# Patient Record
Sex: FEMALE | Race: OTHER | NOT HISPANIC OR LATINO
[De-identification: names, ages, dates, MRNs, and addresses within clinical notes are randomized per-mention and may not be internally consistent; named-entity substitution may affect disease eponyms.]

---

## 2017-01-23 ENCOUNTER — APPOINTMENT (OUTPATIENT)
Dept: CARDIOLOGY | Facility: CLINIC | Age: 27
End: 2017-01-23

## 2017-01-23 VITALS — SYSTOLIC BLOOD PRESSURE: 110 MMHG | DIASTOLIC BLOOD PRESSURE: 70 MMHG | HEART RATE: 89 BPM

## 2017-01-23 VITALS — OXYGEN SATURATION: 99 %

## 2017-01-23 RX ORDER — BLOOD SUGAR DIAGNOSTIC
STRIP MISCELLANEOUS
Qty: 150 | Refills: 0 | Status: ACTIVE | COMMUNITY
Start: 2016-10-30

## 2017-02-16 ENCOUNTER — APPOINTMENT (OUTPATIENT)
Dept: CARDIOLOGY | Facility: CLINIC | Age: 27
End: 2017-02-16

## 2017-03-28 ENCOUNTER — APPOINTMENT (OUTPATIENT)
Dept: CARDIOLOGY | Facility: CLINIC | Age: 27
End: 2017-03-28

## 2017-11-29 ENCOUNTER — APPOINTMENT (OUTPATIENT)
Dept: CARDIOLOGY | Facility: CLINIC | Age: 27
End: 2017-11-29

## 2017-11-29 VITALS — WEIGHT: 133 LBS | HEIGHT: 61 IN | BODY MASS INDEX: 25.11 KG/M2

## 2017-11-29 VITALS — DIASTOLIC BLOOD PRESSURE: 80 MMHG | HEART RATE: 96 BPM | SYSTOLIC BLOOD PRESSURE: 110 MMHG

## 2018-01-03 ENCOUNTER — APPOINTMENT (OUTPATIENT)
Dept: CARDIOLOGY | Facility: CLINIC | Age: 28
End: 2018-01-03

## 2018-03-20 ENCOUNTER — APPOINTMENT (OUTPATIENT)
Dept: CARDIOLOGY | Facility: CLINIC | Age: 28
End: 2018-03-20

## 2021-12-27 ENCOUNTER — APPOINTMENT (OUTPATIENT)
Dept: CARDIOLOGY | Facility: CLINIC | Age: 31
End: 2021-12-27
Payer: COMMERCIAL

## 2021-12-27 VITALS — HEIGHT: 61 IN | TEMPERATURE: 98 F | BODY MASS INDEX: 25.86 KG/M2 | WEIGHT: 137 LBS

## 2021-12-27 VITALS — SYSTOLIC BLOOD PRESSURE: 110 MMHG | HEART RATE: 91 BPM | DIASTOLIC BLOOD PRESSURE: 66 MMHG

## 2021-12-27 PROCEDURE — 93000 ELECTROCARDIOGRAM COMPLETE: CPT

## 2021-12-27 PROCEDURE — 99204 OFFICE O/P NEW MOD 45 MIN: CPT

## 2021-12-27 RX ORDER — ELETRIPTAN HYDROBROMIDE 40 MG/1
40 TABLET, FILM COATED ORAL
Refills: 0 | Status: DISCONTINUED | COMMUNITY
End: 2021-12-27

## 2021-12-27 NOTE — CARDIOLOGY SUMMARY
[___] : [unfilled] [de-identified] : 12- NSR NS T wave change.  [de-identified] :  Average HR 82  [de-identified] : 12- ETT Completed 9 minutes and 36 seconds . No ischemia  [de-identified] : 12- Normal LV systolic function Trace MR and TR

## 2021-12-27 NOTE — ASSESSMENT
[FreeTextEntry1] : The patient has had palpitations which has been in Sinus tachycardia in the past . She has periods when she has tachycardia when she walks short distances . The patient had a Dimer which was low . The patient has had a stress test which was negative for ischemia at high exercise load . Echo showed a  structurally normal heart . Holter showed an average HR of 82 but highest HR was 179 . The patient carries a diagnosis of SLE and has a positive DS DNA . 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

## 2021-12-27 NOTE — REASON FOR VISIT
[Follow-Up - Clinic] : a clinic follow-up of [Chest Pain] : chest pain [Dizziness] : dizziness [Palpitations] : palpitations

## 2021-12-27 NOTE — HISTORY OF PRESENT ILLNESS
[FreeTextEntry1] : The patient has not been seen since 2017 . The patient has long standing history of Type I DM , historyh of Sinus tachycardia in the past ., who 3 weeks ago started to experieince tachycardia . She was seen in [facility] and discharged . D dimer was low and she did not have a CT scan of the chest . The pateint has had some chest discomfort only when going to fast . She had seen a Cardiologist who did a stress test which was negative for ischemia , completed stage III . Echo was unremarkable . She had a Holter monitor which was done with an average HR of 82 . She is stressed with work and with her 3 children one with Autism

## 2021-12-27 NOTE — PHYSICAL EXAM
[General Appearance - Well Developed] : well developed [Normal Appearance] : normal appearance [Well Groomed] : well groomed [General Appearance - Well Nourished] : well nourished [No Deformities] : no deformities [General Appearance - In No Acute Distress] : no acute distress [Normal Conjunctiva] : the conjunctiva exhibited no abnormalities [Eyelids - No Xanthelasma] : the eyelids demonstrated no xanthelasmas [Normal Oral Mucosa] : normal oral mucosa [No Oral Pallor] : no oral pallor [No Oral Cyanosis] : no oral cyanosis [Normal Jugular Venous A Waves Present] : normal jugular venous A waves present [Normal Jugular Venous V Waves Present] : normal jugular venous V waves present [No Jugular Venous Ray A Waves] : no jugular venous ray A waves [Respiration, Rhythm And Depth] : normal respiratory rhythm and effort [Exaggerated Use Of Accessory Muscles For Inspiration] : no accessory muscle use [Auscultation Breath Sounds / Voice Sounds] : lungs were clear to auscultation bilaterally [Heart Rate And Rhythm] : heart rate and rhythm were normal [Heart Sounds] : normal S1 and S2 [Murmurs] : no murmurs present [Abdomen Soft] : soft [Abdomen Tenderness] : non-tender [Abdomen Mass (___ Cm)] : no abdominal mass palpated [Abnormal Walk] : normal gait [Gait - Sufficient For Exercise Testing] : the gait was sufficient for exercise testing [Nail Clubbing] : no clubbing of the fingernails [Cyanosis, Localized] : no localized cyanosis [Petechial Hemorrhages (___cm)] : no petechial hemorrhages [Skin Color & Pigmentation] : normal skin color and pigmentation [] : no rash [No Venous Stasis] : no venous stasis [Skin Lesions] : no skin lesions [No Skin Ulcers] : no skin ulcer [No Xanthoma] : no  xanthoma was observed [Oriented To Time, Place, And Person] : oriented to person, place, and time [Affect] : the affect was normal [Mood] : the mood was normal [No Anxiety] : not feeling anxious

## 2021-12-27 NOTE — REVIEW OF SYSTEMS
[Feeling Fatigued] : feeling fatigued [SOB] : shortness of breath [Dyspnea on exertion] : dyspnea during exertion [Chest Discomfort] : chest discomfort [Negative] : Psychiatric

## 2022-01-31 ENCOUNTER — APPOINTMENT (OUTPATIENT)
Dept: CARDIOLOGY | Facility: CLINIC | Age: 32
End: 2022-01-31
Payer: COMMERCIAL

## 2022-01-31 VITALS — WEIGHT: 136 LBS | TEMPERATURE: 97 F | BODY MASS INDEX: 25.68 KG/M2 | HEIGHT: 61 IN

## 2022-01-31 VITALS — DIASTOLIC BLOOD PRESSURE: 64 MMHG | SYSTOLIC BLOOD PRESSURE: 110 MMHG | HEART RATE: 84 BPM

## 2022-01-31 PROCEDURE — 99214 OFFICE O/P EST MOD 30 MIN: CPT

## 2022-01-31 PROCEDURE — 93000 ELECTROCARDIOGRAM COMPLETE: CPT

## 2022-01-31 NOTE — CARDIOLOGY SUMMARY
[___] : [unfilled] [de-identified] : 12- NSR NS T wave change.  [de-identified] :  Average HR 82  [de-identified] : 12- ETT Completed 9 minutes and 36 seconds . No ischemia  [de-identified] : 12- Normal LV systolic function Trace MR and TR

## 2022-01-31 NOTE — ASSESSMENT
[FreeTextEntry1] : The patient has improved. She has less tachycardia and is going back to the gym. The patient had an ETT  which she was ablae to exercise into stage IV  and echo was structurally normal. MCOT showed an average HR of 83 and her heart rate decreases to the 50's during sleep . The patient has has seen psychiatry and she feels she is depressed . She was told to start Zoloft . D Dimer was low at last ER visit

## 2022-01-31 NOTE — HISTORY OF PRESENT ILLNESS
[FreeTextEntry1] : The patient has been feeling better. MCOT showed that the HR was in the 80's and goes into the 50's when sleeping . 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

## 2022-02-07 ENCOUNTER — APPOINTMENT (OUTPATIENT)
Dept: CARDIOLOGY | Facility: CLINIC | Age: 32
End: 2022-02-07
Payer: COMMERCIAL

## 2022-02-07 PROCEDURE — 93228 REMOTE 30 DAY ECG REV/REPORT: CPT

## 2022-05-20 ENCOUNTER — APPOINTMENT (OUTPATIENT)
Dept: CARDIOLOGY | Facility: CLINIC | Age: 32
End: 2022-05-20

## 2023-11-20 ENCOUNTER — APPOINTMENT (OUTPATIENT)
Dept: CARDIOLOGY | Facility: CLINIC | Age: 33
End: 2023-11-20
Payer: COMMERCIAL

## 2023-11-20 VITALS — TEMPERATURE: 97.6 F | WEIGHT: 137 LBS | HEIGHT: 61 IN | BODY MASS INDEX: 25.86 KG/M2

## 2023-11-20 VITALS — SYSTOLIC BLOOD PRESSURE: 114 MMHG | HEART RATE: 83 BPM | DIASTOLIC BLOOD PRESSURE: 80 MMHG

## 2023-11-20 DIAGNOSIS — E10.9 TYPE 1 DIABETES MELLITUS W/OUT COMPLICATIONS: ICD-10-CM

## 2023-11-20 DIAGNOSIS — M32.9 SYSTEMIC LUPUS ERYTHEMATOSUS, UNSPECIFIED: ICD-10-CM

## 2023-11-20 DIAGNOSIS — G47.33 OBSTRUCTIVE SLEEP APNEA (ADULT) (PEDIATRIC): ICD-10-CM

## 2023-11-20 DIAGNOSIS — E11.9 TYPE 2 DIABETES MELLITUS W/OUT COMPLICATIONS: ICD-10-CM

## 2023-11-20 PROCEDURE — 99214 OFFICE O/P EST MOD 30 MIN: CPT | Mod: 25

## 2023-11-20 PROCEDURE — 93000 ELECTROCARDIOGRAM COMPLETE: CPT

## 2023-11-20 PROCEDURE — 93242 EXT ECG>48HR<7D RECORDING: CPT

## 2023-11-20 RX ORDER — SERTRALINE 25 MG/1
25 TABLET, FILM COATED ORAL
Qty: 30 | Refills: 0 | Status: COMPLETED | COMMUNITY
Start: 2021-11-19 | End: 2023-11-20

## 2023-12-12 ENCOUNTER — APPOINTMENT (OUTPATIENT)
Dept: CARDIOLOGY | Facility: CLINIC | Age: 33
End: 2023-12-12
Payer: COMMERCIAL

## 2023-12-12 ENCOUNTER — TRANSCRIPTION ENCOUNTER (OUTPATIENT)
Age: 33
End: 2023-12-12

## 2023-12-12 DIAGNOSIS — R55 SYNCOPE AND COLLAPSE: ICD-10-CM

## 2023-12-12 DIAGNOSIS — R07.89 OTHER CHEST PAIN: ICD-10-CM

## 2023-12-12 PROCEDURE — 93351 STRESS TTE COMPLETE: CPT

## 2023-12-12 PROCEDURE — 93325 DOPPLER ECHO COLOR FLOW MAPG: CPT

## 2023-12-12 PROCEDURE — 93320 DOPPLER ECHO COMPLETE: CPT

## 2023-12-13 PROBLEM — R07.89 ATYPICAL CHEST PAIN: Status: ACTIVE | Noted: 2023-11-20

## 2023-12-18 ENCOUNTER — APPOINTMENT (OUTPATIENT)
Dept: CARDIOLOGY | Facility: CLINIC | Age: 33
End: 2023-12-18

## 2023-12-22 ENCOUNTER — APPOINTMENT (OUTPATIENT)
Dept: CARDIOLOGY | Facility: CLINIC | Age: 33
End: 2023-12-22
Payer: COMMERCIAL

## 2023-12-22 DIAGNOSIS — R00.2 PALPITATIONS: ICD-10-CM

## 2023-12-22 DIAGNOSIS — R00.0 TACHYCARDIA, UNSPECIFIED: ICD-10-CM

## 2023-12-22 PROCEDURE — 93244 EXT ECG>48HR<7D REV&INTERPJ: CPT

## 2024-01-10 ENCOUNTER — APPOINTMENT (OUTPATIENT)
Dept: CARDIOLOGY | Facility: CLINIC | Age: 34
End: 2024-01-10

## 2024-12-20 ENCOUNTER — APPOINTMENT (INPATIENT)
Dept: RADIOLOGY | Facility: HOSPITAL | Age: 34
DRG: 637 | End: 2024-12-20
Payer: COMMERCIAL

## 2024-12-20 ENCOUNTER — HOSPITAL ENCOUNTER (INPATIENT)
Facility: HOSPITAL | Age: 34
LOS: 1 days | Discharge: HOME | DRG: 637 | End: 2024-12-21
Attending: EMERGENCY MEDICINE | Admitting: INTERNAL MEDICINE
Payer: COMMERCIAL

## 2024-12-20 DIAGNOSIS — E11.10 DKA (DIABETIC KETOACIDOSIS) (HCC): ICD-10-CM

## 2024-12-20 DIAGNOSIS — E10.69 TYPE 1 DIABETES MELLITUS WITH OTHER SPECIFIED COMPLICATION (HCC): ICD-10-CM

## 2024-12-20 DIAGNOSIS — R73.9 HYPERGLYCEMIA: Primary | ICD-10-CM

## 2024-12-20 DIAGNOSIS — E10.10 DKA, TYPE 1 (HCC): ICD-10-CM

## 2024-12-20 PROBLEM — I47.10 PAROXYSMAL SUPRAVENTRICULAR TACHYCARDIA (HCC): Status: ACTIVE | Noted: 2024-08-14

## 2024-12-20 PROBLEM — L93.2 CUTANEOUS LUPUS ERYTHEMATOSUS: Status: ACTIVE | Noted: 2024-04-01

## 2024-12-20 PROBLEM — G93.5 ARNOLD-CHIARI MALFORMATION, TYPE I (HCC): Status: ACTIVE | Noted: 2024-10-03

## 2024-12-20 PROBLEM — F32.A ANXIETY AND DEPRESSION: Status: ACTIVE | Noted: 2024-11-18

## 2024-12-20 PROBLEM — F41.9 ANXIETY AND DEPRESSION: Status: ACTIVE | Noted: 2024-11-18

## 2024-12-20 PROBLEM — E11.43: Status: ACTIVE | Noted: 2024-10-03

## 2024-12-20 PROBLEM — K31.84: Status: ACTIVE | Noted: 2024-10-03

## 2024-12-20 LAB
ALBUMIN SERPL BCG-MCNC: 4.5 G/DL (ref 3.5–5)
ALP SERPL-CCNC: 56 U/L (ref 34–104)
ALT SERPL W P-5'-P-CCNC: 12 U/L (ref 7–52)
ANION GAP SERPL CALCULATED.3IONS-SCNC: 10 MMOL/L (ref 4–13)
ANION GAP SERPL CALCULATED.3IONS-SCNC: 14 MMOL/L (ref 4–13)
ANION GAP SERPL CALCULATED.3IONS-SCNC: 4 MMOL/L (ref 4–13)
ANION GAP SERPL CALCULATED.3IONS-SCNC: 5 MMOL/L (ref 4–13)
ANION GAP SERPL CALCULATED.3IONS-SCNC: 6 MMOL/L (ref 4–13)
AST SERPL W P-5'-P-CCNC: 12 U/L (ref 13–39)
ATRIAL RATE: 103 BPM
B-OH-BUTYR SERPL-MCNC: 2.95 MMOL/L (ref 0.02–0.27)
BACTERIA UR QL AUTO: NORMAL /HPF
BASE EX.OXY STD BLDV CALC-SCNC: 86.5 % (ref 60–80)
BASE EXCESS BLDV CALC-SCNC: -9.9 MMOL/L
BASOPHILS # BLD AUTO: 0.03 THOUSANDS/ÂΜL (ref 0–0.1)
BASOPHILS NFR BLD AUTO: 0 % (ref 0–1)
BILIRUB SERPL-MCNC: 0.84 MG/DL (ref 0.2–1)
BILIRUB UR QL STRIP: NEGATIVE
BUN SERPL-MCNC: 19 MG/DL (ref 5–25)
BUN SERPL-MCNC: 22 MG/DL (ref 5–25)
BUN SERPL-MCNC: 23 MG/DL (ref 5–25)
CALCIUM SERPL-MCNC: 7.6 MG/DL (ref 8.4–10.2)
CALCIUM SERPL-MCNC: 8 MG/DL (ref 8.4–10.2)
CALCIUM SERPL-MCNC: 8.1 MG/DL (ref 8.4–10.2)
CALCIUM SERPL-MCNC: 8.1 MG/DL (ref 8.4–10.2)
CALCIUM SERPL-MCNC: 9.4 MG/DL (ref 8.4–10.2)
CHLORIDE SERPL-SCNC: 106 MMOL/L (ref 96–108)
CHLORIDE SERPL-SCNC: 107 MMOL/L (ref 96–108)
CHLORIDE SERPL-SCNC: 109 MMOL/L (ref 96–108)
CHLORIDE SERPL-SCNC: 109 MMOL/L (ref 96–108)
CHLORIDE SERPL-SCNC: 97 MMOL/L (ref 96–108)
CLARITY UR: CLEAR
CO2 SERPL-SCNC: 16 MMOL/L (ref 21–32)
CO2 SERPL-SCNC: 20 MMOL/L (ref 21–32)
CO2 SERPL-SCNC: 20 MMOL/L (ref 21–32)
CO2 SERPL-SCNC: 21 MMOL/L (ref 21–32)
CO2 SERPL-SCNC: 22 MMOL/L (ref 21–32)
COLOR UR: ABNORMAL
CREAT SERPL-MCNC: 0.9 MG/DL (ref 0.6–1.3)
CREAT SERPL-MCNC: 0.9 MG/DL (ref 0.6–1.3)
CREAT SERPL-MCNC: 1.07 MG/DL (ref 0.6–1.3)
CREAT SERPL-MCNC: 1.1 MG/DL (ref 0.6–1.3)
CREAT SERPL-MCNC: 1.15 MG/DL (ref 0.6–1.3)
EOSINOPHIL # BLD AUTO: 0.14 THOUSAND/ÂΜL (ref 0–0.61)
EOSINOPHIL NFR BLD AUTO: 1 % (ref 0–6)
ERYTHROCYTE [DISTWIDTH] IN BLOOD BY AUTOMATED COUNT: 12.3 % (ref 11.6–15.1)
EST. AVERAGE GLUCOSE BLD GHB EST-MCNC: 192 MG/DL
FLUAV AG UPPER RESP QL IA.RAPID: NEGATIVE
FLUBV AG UPPER RESP QL IA.RAPID: NEGATIVE
GFR SERPL CREATININE-BSD FRML MDRD: 62 ML/MIN/1.73SQ M
GFR SERPL CREATININE-BSD FRML MDRD: 66 ML/MIN/1.73SQ M
GFR SERPL CREATININE-BSD FRML MDRD: 68 ML/MIN/1.73SQ M
GFR SERPL CREATININE-BSD FRML MDRD: 84 ML/MIN/1.73SQ M
GFR SERPL CREATININE-BSD FRML MDRD: 84 ML/MIN/1.73SQ M
GLUCOSE SERPL-MCNC: 132 MG/DL (ref 65–140)
GLUCOSE SERPL-MCNC: 140 MG/DL (ref 65–140)
GLUCOSE SERPL-MCNC: 179 MG/DL (ref 65–140)
GLUCOSE SERPL-MCNC: 196 MG/DL (ref 65–140)
GLUCOSE SERPL-MCNC: 197 MG/DL (ref 65–140)
GLUCOSE SERPL-MCNC: 221 MG/DL (ref 65–140)
GLUCOSE SERPL-MCNC: 228 MG/DL (ref 65–140)
GLUCOSE SERPL-MCNC: 279 MG/DL (ref 65–140)
GLUCOSE SERPL-MCNC: 289 MG/DL (ref 65–140)
GLUCOSE SERPL-MCNC: 296 MG/DL (ref 65–140)
GLUCOSE SERPL-MCNC: 297 MG/DL (ref 65–140)
GLUCOSE SERPL-MCNC: 320 MG/DL (ref 65–140)
GLUCOSE SERPL-MCNC: 448 MG/DL (ref 65–140)
GLUCOSE SERPL-MCNC: 458 MG/DL (ref 65–140)
GLUCOSE SERPL-MCNC: 469 MG/DL (ref 65–140)
GLUCOSE SERPL-MCNC: 539 MG/DL (ref 65–140)
GLUCOSE UR STRIP-MCNC: ABNORMAL MG/DL
HBA1C MFR BLD: 8.3 %
HCG SERPL QL: NEGATIVE
HCO3 BLDV-SCNC: 15.6 MMOL/L (ref 24–30)
HCT VFR BLD AUTO: 40 % (ref 34.8–46.1)
HGB BLD-MCNC: 13.4 G/DL (ref 11.5–15.4)
HGB UR QL STRIP.AUTO: NEGATIVE
IMM GRANULOCYTES # BLD AUTO: 0.04 THOUSAND/UL (ref 0–0.2)
IMM GRANULOCYTES NFR BLD AUTO: 0 % (ref 0–2)
KETONES UR STRIP-MCNC: ABNORMAL MG/DL
LACTATE SERPL-SCNC: 1.1 MMOL/L (ref 0.5–2)
LEUKOCYTE ESTERASE UR QL STRIP: ABNORMAL
LIPASE SERPL-CCNC: 31 U/L (ref 11–82)
LYMPHOCYTES # BLD AUTO: 1.59 THOUSANDS/ÂΜL (ref 0.6–4.47)
LYMPHOCYTES NFR BLD AUTO: 13 % (ref 14–44)
MCH RBC QN AUTO: 31 PG (ref 26.8–34.3)
MCHC RBC AUTO-ENTMCNC: 33.5 G/DL (ref 31.4–37.4)
MCV RBC AUTO: 93 FL (ref 82–98)
MONOCYTES # BLD AUTO: 0.6 THOUSAND/ÂΜL (ref 0.17–1.22)
MONOCYTES NFR BLD AUTO: 5 % (ref 4–12)
NEUTROPHILS # BLD AUTO: 10.21 THOUSANDS/ÂΜL (ref 1.85–7.62)
NEUTS SEG NFR BLD AUTO: 81 % (ref 43–75)
NITRITE UR QL STRIP: NEGATIVE
NON-SQ EPI CELLS URNS QL MICRO: NORMAL /HPF
NRBC BLD AUTO-RTO: 0 /100 WBCS
O2 CT BLDV-SCNC: 16.5 ML/DL
P AXIS: 77 DEGREES
PCO2 BLDV: 33.3 MM HG (ref 42–50)
PH BLDV: 7.29 [PH] (ref 7.3–7.4)
PH UR STRIP.AUTO: 5.5 [PH]
PLATELET # BLD AUTO: 236 THOUSANDS/UL (ref 149–390)
PLATELET # BLD AUTO: 296 THOUSANDS/UL (ref 149–390)
PMV BLD AUTO: 8.8 FL (ref 8.9–12.7)
PMV BLD AUTO: 9.3 FL (ref 8.9–12.7)
PO2 BLDV: 62.5 MM HG (ref 35–45)
POTASSIUM SERPL-SCNC: 3.9 MMOL/L (ref 3.5–5.3)
POTASSIUM SERPL-SCNC: 3.9 MMOL/L (ref 3.5–5.3)
POTASSIUM SERPL-SCNC: 4 MMOL/L (ref 3.5–5.3)
POTASSIUM SERPL-SCNC: 4.3 MMOL/L (ref 3.5–5.3)
POTASSIUM SERPL-SCNC: 4.4 MMOL/L (ref 3.5–5.3)
PR INTERVAL: 136 MS
PROT SERPL-MCNC: 7.5 G/DL (ref 6.4–8.4)
PROT UR STRIP-MCNC: NEGATIVE MG/DL
QRS AXIS: 81 DEGREES
QRSD INTERVAL: 80 MS
QT INTERVAL: 356 MS
QTC INTERVAL: 466 MS
RBC # BLD AUTO: 4.32 MILLION/UL (ref 3.81–5.12)
RBC #/AREA URNS AUTO: NORMAL /HPF
SARS-COV+SARS-COV-2 AG RESP QL IA.RAPID: NEGATIVE
SODIUM SERPL-SCNC: 131 MMOL/L (ref 135–147)
SODIUM SERPL-SCNC: 132 MMOL/L (ref 135–147)
SODIUM SERPL-SCNC: 133 MMOL/L (ref 135–147)
SODIUM SERPL-SCNC: 135 MMOL/L (ref 135–147)
SODIUM SERPL-SCNC: 135 MMOL/L (ref 135–147)
SP GR UR STRIP.AUTO: 1.03 (ref 1–1.03)
T WAVE AXIS: 66 DEGREES
TSH SERPL DL<=0.05 MIU/L-ACNC: 0.45 UIU/ML (ref 0.45–4.5)
UROBILINOGEN UR STRIP-ACNC: <2 MG/DL
VENTRICULAR RATE: 103 BPM
WBC # BLD AUTO: 12.61 THOUSAND/UL (ref 4.31–10.16)
WBC #/AREA URNS AUTO: NORMAL /HPF

## 2024-12-20 PROCEDURE — 85049 AUTOMATED PLATELET COUNT: CPT | Performed by: INTERNAL MEDICINE

## 2024-12-20 PROCEDURE — 83690 ASSAY OF LIPASE: CPT

## 2024-12-20 PROCEDURE — 87804 INFLUENZA ASSAY W/OPTIC: CPT

## 2024-12-20 PROCEDURE — 87811 SARS-COV-2 COVID19 W/OPTIC: CPT

## 2024-12-20 PROCEDURE — 36415 COLL VENOUS BLD VENIPUNCTURE: CPT

## 2024-12-20 PROCEDURE — 82948 REAGENT STRIP/BLOOD GLUCOSE: CPT

## 2024-12-20 PROCEDURE — 83605 ASSAY OF LACTIC ACID: CPT

## 2024-12-20 PROCEDURE — 82805 BLOOD GASES W/O2 SATURATION: CPT

## 2024-12-20 PROCEDURE — 93005 ELECTROCARDIOGRAM TRACING: CPT

## 2024-12-20 PROCEDURE — 84443 ASSAY THYROID STIM HORMONE: CPT | Performed by: INTERNAL MEDICINE

## 2024-12-20 PROCEDURE — 82010 KETONE BODYS QUAN: CPT

## 2024-12-20 PROCEDURE — 96374 THER/PROPH/DIAG INJ IV PUSH: CPT

## 2024-12-20 PROCEDURE — 80048 BASIC METABOLIC PNL TOTAL CA: CPT | Performed by: INTERNAL MEDICINE

## 2024-12-20 PROCEDURE — 99285 EMERGENCY DEPT VISIT HI MDM: CPT

## 2024-12-20 PROCEDURE — 83036 HEMOGLOBIN GLYCOSYLATED A1C: CPT | Performed by: INTERNAL MEDICINE

## 2024-12-20 PROCEDURE — 80048 BASIC METABOLIC PNL TOTAL CA: CPT

## 2024-12-20 PROCEDURE — 80053 COMPREHEN METABOLIC PANEL: CPT

## 2024-12-20 PROCEDURE — 71045 X-RAY EXAM CHEST 1 VIEW: CPT

## 2024-12-20 PROCEDURE — 96375 TX/PRO/DX INJ NEW DRUG ADDON: CPT

## 2024-12-20 PROCEDURE — 84703 CHORIONIC GONADOTROPIN ASSAY: CPT

## 2024-12-20 PROCEDURE — 99223 1ST HOSP IP/OBS HIGH 75: CPT | Performed by: INTERNAL MEDICINE

## 2024-12-20 PROCEDURE — 85025 COMPLETE CBC W/AUTO DIFF WBC: CPT

## 2024-12-20 PROCEDURE — 96361 HYDRATE IV INFUSION ADD-ON: CPT

## 2024-12-20 PROCEDURE — 81001 URINALYSIS AUTO W/SCOPE: CPT

## 2024-12-20 PROCEDURE — 93010 ELECTROCARDIOGRAM REPORT: CPT | Performed by: STUDENT IN AN ORGANIZED HEALTH CARE EDUCATION/TRAINING PROGRAM

## 2024-12-20 RX ORDER — INSULIN LISPRO 100 [IU]/ML
1-5 INJECTION, SOLUTION INTRAVENOUS; SUBCUTANEOUS
Status: DISCONTINUED | OUTPATIENT
Start: 2024-12-21 | End: 2024-12-21 | Stop reason: HOSPADM

## 2024-12-20 RX ORDER — INSULIN LISPRO 100 [IU]/ML
1-5 INJECTION, SOLUTION INTRAVENOUS; SUBCUTANEOUS EVERY 6 HOURS SCHEDULED
Status: DISCONTINUED | OUTPATIENT
Start: 2024-12-20 | End: 2024-12-20

## 2024-12-20 RX ORDER — HEPARIN SODIUM 5000 [USP'U]/ML
5000 INJECTION, SOLUTION INTRAVENOUS; SUBCUTANEOUS EVERY 8 HOURS SCHEDULED
Status: DISCONTINUED | OUTPATIENT
Start: 2024-12-20 | End: 2024-12-21 | Stop reason: HOSPADM

## 2024-12-20 RX ORDER — ONDANSETRON 2 MG/ML
4 INJECTION INTRAMUSCULAR; INTRAVENOUS ONCE
Status: COMPLETED | OUTPATIENT
Start: 2024-12-20 | End: 2024-12-20

## 2024-12-20 RX ORDER — ACETAMINOPHEN 325 MG/1
650 TABLET ORAL EVERY 6 HOURS PRN
Status: DISCONTINUED | OUTPATIENT
Start: 2024-12-20 | End: 2024-12-21 | Stop reason: HOSPADM

## 2024-12-20 RX ORDER — INSULIN LISPRO 100 [IU]/ML
1-5 INJECTION, SOLUTION INTRAVENOUS; SUBCUTANEOUS
Status: DISCONTINUED | OUTPATIENT
Start: 2024-12-20 | End: 2024-12-21 | Stop reason: HOSPADM

## 2024-12-20 RX ORDER — NORETHINDRONE ACETATE AND ETHINYL ESTRADIOL 1MG-20(21)
1 KIT ORAL DAILY
Status: DISCONTINUED | OUTPATIENT
Start: 2024-12-20 | End: 2024-12-21 | Stop reason: HOSPADM

## 2024-12-20 RX ORDER — ONDANSETRON 2 MG/ML
4 INJECTION INTRAMUSCULAR; INTRAVENOUS EVERY 6 HOURS PRN
Status: DISCONTINUED | OUTPATIENT
Start: 2024-12-20 | End: 2024-12-21 | Stop reason: HOSPADM

## 2024-12-20 RX ORDER — SODIUM CHLORIDE 9 MG/ML
75 INJECTION, SOLUTION INTRAVENOUS CONTINUOUS
Status: DISCONTINUED | OUTPATIENT
Start: 2024-12-20 | End: 2024-12-21 | Stop reason: HOSPADM

## 2024-12-20 RX ORDER — INSULIN GLARGINE 100 [IU]/ML
8 INJECTION, SOLUTION SUBCUTANEOUS ONCE
Status: COMPLETED | OUTPATIENT
Start: 2024-12-20 | End: 2024-12-20

## 2024-12-20 RX ADMIN — HEPARIN SODIUM 5000 UNITS: 5000 INJECTION, SOLUTION INTRAVENOUS; SUBCUTANEOUS at 14:57

## 2024-12-20 RX ADMIN — INSULIN GLARGINE 8 UNITS: 100 INJECTION, SOLUTION SUBCUTANEOUS at 09:49

## 2024-12-20 RX ADMIN — SODIUM CHLORIDE 100 ML/HR: 0.9 INJECTION, SOLUTION INTRAVENOUS at 12:43

## 2024-12-20 RX ADMIN — INSULIN HUMAN 6 UNITS: 100 INJECTION, SOLUTION PARENTERAL at 07:05

## 2024-12-20 RX ADMIN — SODIUM CHLORIDE 1000 ML: 0.9 INJECTION, SOLUTION INTRAVENOUS at 05:24

## 2024-12-20 RX ADMIN — NORETHINDRONE ACETATE AND ETHINYL ESTRADIOL AND FERROUS FUMARATE 1 TABLET: KIT at 14:57

## 2024-12-20 RX ADMIN — INSULIN LISPRO 3 UNITS: 100 INJECTION, SOLUTION INTRAVENOUS; SUBCUTANEOUS at 17:02

## 2024-12-20 RX ADMIN — SODIUM CHLORIDE 5 UNITS/HR: 9 INJECTION, SOLUTION INTRAVENOUS at 09:21

## 2024-12-20 RX ADMIN — INSULIN LISPRO 1 UNITS: 100 INJECTION, SOLUTION INTRAVENOUS; SUBCUTANEOUS at 22:21

## 2024-12-20 RX ADMIN — HEPARIN SODIUM 5000 UNITS: 5000 INJECTION, SOLUTION INTRAVENOUS; SUBCUTANEOUS at 22:21

## 2024-12-20 RX ADMIN — ONDANSETRON 4 MG: 2 INJECTION INTRAMUSCULAR; INTRAVENOUS at 06:47

## 2024-12-20 NOTE — ASSESSMENT & PLAN NOTE
"No results found for: \"HGBA1C\"    Recent Labs     12/20/24  0753 12/20/24  0920 12/20/24  1041 12/20/24  1122   POCGLU 296* 289* 228* 179*       Blood Sugar Average: Last 72 hrs:  (P) [id number]  Denies abdominal pain  "

## 2024-12-20 NOTE — ASSESSMENT & PLAN NOTE
"No results found for: \"HGBA1C\"    Recent Labs     12/20/24  0753 12/20/24  0920 12/20/24  1041 12/20/24  1122   POCGLU 296* 289* 228* 179*       Blood Sugar Average: Last 72 hrs:  (P) [id number]    Patient presented with mild DKA.  Blood sugar 539, anion gap 14, bicarb 20, beta hydroxybutyrate 2.95  Patient with history of DKA in the past, type 1 diabetes on insulin pump  Per patient since yesterday her pump was reading high in spite of giving multiple boluses.  Usually DKA happens when she has UTI.  But this time no symptoms of UTI or URI.  ICU was consulted by ER recommended admission on the floor with regular insulin infusion no need for DKA protocol.  Received 6 unit insulin and IV fluid in ER, repeat BMP and ankle has already closed.  Admitted in stepdown, taper insulin infusion based on fingerstick sugar, BMP  Long-acting and insulin sliding scale  Hold insulin pump for now  No obvious source of sepsis      "

## 2024-12-20 NOTE — ED PROVIDER NOTES
Time reflects when diagnosis was documented in both MDM as applicable and the Disposition within this note       Time User Action Codes Description Comment    12/20/2024  7:20 AM Peter Matias [R73.9] Hyperglycemia     12/20/2024  7:20 AM Peter Matias Add [E11.10] DKA (diabetic ketoacidosis) (HCC)     12/21/2024 11:53 AM Naveed Mora Add [E10.10] DKA, type 1 (HCC)     12/21/2024 11:53 AM Naveed Mora Add [E10.69] Type 1 diabetes mellitus with other specified complication (HCC)           ED Disposition       ED Disposition   Admit    Condition   Stable    Date/Time   Fri Dec 20, 2024  8:47 AM    Comment                  Assessment & Plan       Medical Decision Making  This patient presents with hyperglycemia and symptoms concerning for DKA. Differential diagnosis includes other metabolic causes of hyperglycemia such as HHS, worsened diabetes or medication noncompliance. Considered possible causes of DKA to include infection (intrabdominal infection, UTI, pneumonia), infarction / ischemia (acute coronary syndrome, cerebral vascular accident, pulmonary embolism), medication non-compliance with insulin therapy, illicit substance abuse, iatrogenic (including prescription medications and drug-drug interactions), idiopathic causes. Most likely etiology at this time is pump malfunction. Patient given fluids and insulin bolus, repeat BMP improved, admitted to [facility] for further treatment and evaluation.     Problems Addressed:  DKA (diabetic ketoacidosis) (HCC): acute illness or injury  Hyperglycemia: acute illness or injury    Amount and/or Complexity of Data Reviewed  Labs: ordered. Decision-making details documented in ED Course.    Risk  OTC drugs.  Prescription drug management.  Decision regarding hospitalization.        ED Course as of 12/23/24 2154   Fri Dec 20, 2024   0622 pH, Kurt(!): 7.288   0622 Beta- Hydroxybutyrate(!): 2.95   0626 ANION GAP(!): 14   0632 POC Glucose(!!): 458  "      Medications   sodium chloride 0.9 % bolus 1,000 mL (0 mL Intravenous Stopped 24 06)   ondansetron (ZOFRAN) injection 4 mg (4 mg Intravenous Given 24 0647)   insulin regular (HumuLIN R,NovoLIN R) injection 6 Units (6 Units Intravenous Given 24 0705)   insulin glargine (LANTUS) subcutaneous injection 8 Units 0.08 mL (8 Units Subcutaneous Given 24 0949)       ED Risk Strat Scores                                              History of Present Illness       Chief Complaint   Patient presents with    Hyperglycemia - Symptomatic     Pt states \"I think I'm in DKA\" type 1, unable to obtain BG on pump reading too high on approx 30 units of insulin since , +nausea and fatigue       Past Medical History:   Diagnosis Date    Diabetes mellitus (HCC)     Gastroparesis     Lupus (systemic lupus erythematosus) (HCC)     PCOS (polycystic ovarian syndrome)       Past Surgical History:   Procedure Laterality Date    ABDOMINOPLASTY       SECTION       SECTION      CHOLECYSTECTOMY        History reviewed. No pertinent family history.   Social History     Tobacco Use    Smoking status: Never    Smokeless tobacco: Never      E-Cigarette/Vaping      E-Cigarette/Vaping Substances      I have reviewed and agree with the history as documented.     The patient is a 34 y.o. female with a history of diabetes, gastroparesis, lupus, PCOS who presents to [facility] with a chief complaint of hyperglycemia. Symptoms began last night around 8pm when she reports that her insulin pump was reading high. She states that she attempted to bolus herself multiple times throughout the night and her sugar would show it to come down slightly then right back up. She reports that she has been in DKA in the past and the last time she had a UTI. She also reports pump failure in the past. She denies any pain anywhere. Associated symptoms include nausea. Symptoms are aggravated with none " noted and alleviating factors include none noted. The patient denies fever, chills, night sweats, chest pain, shortness of breath, cough, sputum, hemoptysis, hematemesis, vomiting, diarrhea, hematochezia, melena, dysuria, frequency, urgency, hesitancy, recent travel, recent antibiotics. No other reported symptoms at this time.  Patient affirms allergies to penicillins, Eliquis, latex, ciprofloxacin            History provided by:  Patient   used: No    Hyperglycemia - Symptomatic  Associated symptoms: nausea    Associated symptoms: no abdominal pain, no chest pain, no dizziness, no dysuria, no fever, no shortness of breath and no vomiting        Review of Systems   Constitutional:  Negative for chills and fever.   HENT:  Negative for ear pain and sore throat.    Eyes:  Negative for pain and visual disturbance.   Respiratory:  Negative for cough and shortness of breath.    Cardiovascular:  Negative for chest pain and palpitations.   Gastrointestinal:  Positive for nausea. Negative for abdominal pain and vomiting.   Genitourinary:  Negative for dysuria and hematuria.   Musculoskeletal:  Negative for arthralgias and back pain.   Skin:  Negative for color change and rash.   Neurological:  Negative for dizziness, seizures, syncope, facial asymmetry, light-headedness and headaches.   All other systems reviewed and are negative.          Objective       ED Triage Vitals   Temperature Pulse Blood Pressure Respirations SpO2 Patient Position - Orthostatic VS   12/20/24 0507 12/20/24 0507 12/20/24 0507 12/20/24 0554 12/20/24 0507 12/20/24 0507   (!) 97.3 °F (36.3 °C) (!) 106 139/75 17 99 % Sitting      Temp Source Heart Rate Source BP Location FiO2 (%) Pain Score    12/20/24 0507 12/20/24 0507 12/20/24 0507 -- 12/20/24 1100    Oral Monitor Left arm  No Pain      Vitals      Date and Time Temp Pulse SpO2 Resp BP Pain Score FACES Pain Rating User   12/21/24 0900 -- -- -- -- -- No Pain -- NSL   12/21/24 0800  98.5 °F (36.9 °C) 88 97 % 20 105/65 -- -- NSL   12/21/24 0400 -- -- -- -- -- No Pain -- LS   12/20/24 2230 -- -- -- -- 100/64 -- -- LS   12/20/24 2230 98.2 °F (36.8 °C) -- -- -- -- -- -- SD   12/20/24 2000 -- -- -- -- -- No Pain -- LS   12/20/24 1900 99 °F (37.2 °C) -- -- -- 119/72 -- -- SD   12/20/24 1600 -- -- -- -- -- No Pain -- MT   12/20/24 1300 98.9 °F (37.2 °C) -- -- -- 110/71 No Pain -- BA   12/20/24 1200 -- -- -- -- -- No Pain -- MT   12/20/24 1100 98.4 °F (36.9 °C) -- -- -- -- No Pain -- MT   12/20/24 0900 -- 85 97 % 22 102/62 -- -- TF   12/20/24 0830 -- 107 99 % 20 108/60 -- -- TF   12/20/24 0745 -- 89 96 % 20 -- -- -- TF   12/20/24 0554 -- -- -- 17 -- -- -- KL   12/20/24 0507 97.3 °F (36.3 °C) 106 99 % -- 139/75 -- -- KG            Physical Exam  Constitutional:       General: She is not in acute distress.     Appearance: Normal appearance. She is not toxic-appearing.   HENT:      Head: Normocephalic.      Right Ear: Tympanic membrane, ear canal and external ear normal.      Left Ear: Tympanic membrane, ear canal and external ear normal.      Nose: Nose normal.      Mouth/Throat:      Mouth: Mucous membranes are moist.      Pharynx: Oropharynx is clear.   Eyes:      Conjunctiva/sclera: Conjunctivae normal.      Pupils: Pupils are equal, round, and reactive to light.   Neck:      Vascular: No carotid bruit.   Cardiovascular:      Rate and Rhythm: Normal rate.      Pulses: Normal pulses.   Pulmonary:      Effort: Pulmonary effort is normal. No respiratory distress.      Breath sounds: Normal breath sounds. No stridor. No wheezing, rhonchi or rales.   Abdominal:      General: Abdomen is flat. Bowel sounds are normal.      Palpations: Abdomen is soft.      Tenderness: There is no abdominal tenderness.   Musculoskeletal:         General: No swelling, tenderness or deformity. Normal range of motion.      Cervical back: Normal range of motion and neck supple. No tenderness.      Right lower leg: No edema.       Left lower leg: No edema.   Skin:     General: Skin is warm and dry.      Capillary Refill: Capillary refill takes less than 2 seconds.      Coloration: Skin is not jaundiced or pale.      Findings: No bruising, erythema or lesion.   Neurological:      General: No focal deficit present.      Mental Status: She is alert and oriented to person, place, and time. Mental status is at baseline.         Results Reviewed       Procedure Component Value Units Date/Time    Basic metabolic panel [[id number]]  (Abnormal) Collected: 12/21/24 0431    Lab Status: Final result Specimen: Blood from Arm, Left Updated: 12/21/24 0457     Sodium 135 mmol/L      Potassium 4.0 mmol/L      Chloride 108 mmol/L      CO2 20 mmol/L      ANION GAP 7 mmol/L      BUN 16 mg/dL      Creatinine 0.89 mg/dL      Glucose 134 mg/dL      Calcium 7.9 mg/dL      eGFR 85 ml/min/1.73sq m     Narrative:      National Kid[employer] Disease Foundation guidelines for Chronic Kidney Disease (CKD):     Stage 1 with normal or high GFR (GFR > 90 mL/min/1.73 square meters)    Stage 2 Mild CKD (GFR = 60-89 mL/min/1.73 square meters)    Stage 3A Moderate CKD (GFR = 45-59 mL/min/1.73 square meters)    Stage 3B Moderate CKD (GFR = 30-44 mL/min/1.73 square meters)    Stage 4 Severe CKD (GFR = 15-29 mL/min/1.73 square meters)    Stage 5 End Stage CKD (GFR <15 mL/min/1.73 square meters)  Note: GFR calculation is accurate only with a steady state creatinine    Basic metabolic panel [[id number]]  (Abnormal) Collected: 12/20/24 2029    Lab Status: Final result Specimen: Blood from Arm, Left Updated: 12/20/24 2050     Sodium 133 mmol/L      Potassium 4.0 mmol/L      Chloride 107 mmol/L      CO2 22 mmol/L      ANION GAP 4 mmol/L      BUN 22 mg/dL      Creatinine 1.10 mg/dL      Glucose 221 mg/dL      Calcium 8.1 mg/dL      eGFR 66 ml/min/1.73sq m     Narrative:      National Kidney Disease Foundation guidelines for Chronic Kidney Disease (CKD):     Stage 1 with normal or high GFR (GFR >  90 mL/min/1.73 square meters)    Stage 2 Mild CKD (GFR = 60-89 mL/min/1.73 square meters)    Stage 3A Moderate CKD (GFR = 45-59 mL/min/1.73 square meters)    Stage 3B Moderate CKD (GFR = 30-44 mL/min/1.73 square meters)    Stage 4 Severe CKD (GFR = 15-29 mL/min/1.73 square meters)    Stage 5 End Stage CKD (GFR <15 mL/min/1.73 square meters)  Note: GFR calculation is accurate only with a steady state creatinine    Hemoglobin A1C [[id number]]  (Abnormal) Collected: 12/20/24 0526    Lab Status: Final result Specimen: Blood from Arm, Right Updated: 12/20/24 1739     Hemoglobin A1C 8.3 %       mg/dl     Basic metabolic panel [[id number]]  (Abnormal) Collected: 12/20/24 1657    Lab Status: Final result Specimen: Blood from Arm, Right Updated: 12/20/24 1725     Sodium 132 mmol/L      Potassium 4.4 mmol/L      Chloride 106 mmol/L      CO2 20 mmol/L      ANION GAP 6 mmol/L      BUN 22 mg/dL      Creatinine 1.07 mg/dL      Glucose 297 mg/dL      Calcium 8.0 mg/dL      eGFR 68 ml/min/1.73sq m     Narrative:      National Kid[employer] Disease Foundation guidelines for Chronic Kidney Disease (CKD):     Stage 1 with normal or high GFR (GFR > 90 mL/min/1.73 square meters)    Stage 2 Mild CKD (GFR = 60-89 mL/min/1.73 square meters)    Stage 3A Moderate CKD (GFR = 45-59 mL/min/1.73 square meters)    Stage 3B Moderate CKD (GFR = 30-44 mL/min/1.73 square meters)    Stage 4 Severe CKD (GFR = 15-29 mL/min/1.73 square meters)    Stage 5 End Stage CKD (GFR <15 mL/min/1.73 square meters)  Note: GFR calculation is accurate only with a steady state creatinine    TSH, 3rd generation with Free T4 reflex [[id number]]  (Normal) Collected: 12/20/24 0754    Lab Status: Final result Specimen: Blood from Arm, Right Updated: 12/20/24 1302     TSH 3RD GENERATON 0.452 uIU/mL     Narrative:      verified by repeat analysis.    Basic metabolic panel [[id number]]  (Abnormal) Collected: 12/20/24 1234    Lab Status: Final result Specimen: Blood from Arm, Left  Updated: 12/20/24 1255     Sodium 135 mmol/L      Potassium 3.9 mmol/L      Chloride 109 mmol/L      CO2 21 mmol/L      ANION GAP 5 mmol/L      BUN 19 mg/dL      Creatinine 0.90 mg/dL      Glucose 140 mg/dL      Calcium 8.1 mg/dL      eGFR 84 ml/min/1.73sq m     Narrative:      National Kid[employer] Disease Foundation guidelines for Chronic Kidney Disease (CKD):     Stage 1 with normal or high GFR (GFR > 90 mL/min/1.73 square meters)    Stage 2 Mild CKD (GFR = 60-89 mL/min/1.73 square meters)    Stage 3A Moderate CKD (GFR = 45-59 mL/min/1.73 square meters)    Stage 3B Moderate CKD (GFR = 30-44 mL/min/1.73 square meters)    Stage 4 Severe CKD (GFR = 15-29 mL/min/1.73 square meters)    Stage 5 End Stage CKD (GFR <15 mL/min/1.73 square meters)  Note: GFR calculation is accurate only with a steady state creatinine    Fingerstick Glucose (POCT) [[id number]]  (Abnormal) Collected: 12/20/24 0920    Lab Status: Final result Specimen: Blood Updated: 12/20/24 0920     POC Glucose 289 mg/dl     Basic metabolic panel [[id number]]  (Abnormal) Collected: 12/20/24 0754    Lab Status: Final result Specimen: Blood from Arm, Right Updated: 12/20/24 0837     Sodium 135 mmol/L      Potassium 3.9 mmol/L      Chloride 109 mmol/L      CO2 16 mmol/L      ANION GAP 10 mmol/L      BUN 22 mg/dL      Creatinine 0.90 mg/dL      Glucose 320 mg/dL      Calcium 7.6 mg/dL      eGFR 84 ml/min/1.73sq m     Narrative:      verified by repeat analysis.  National Kidney Disease Foundation guidelines for Chronic Kidney Disease (CKD):     Stage 1 with normal or high GFR (GFR > 90 mL/min/1.73 square meters)    Stage 2 Mild CKD (GFR = 60-89 mL/min/1.73 square meters)    Stage 3A Moderate CKD (GFR = 45-59 mL/min/1.73 square meters)    Stage 3B Moderate CKD (GFR = 30-44 mL/min/1.73 square meters)    Stage 4 Severe CKD (GFR = 15-29 mL/min/1.73 square meters)    Stage 5 End Stage CKD (GFR <15 mL/min/1.73 square meters)  Note: GFR calculation is accurate only with a  steady state creatinine    Fingerstick Glucose (POCT) [[id number]]  (Abnormal) Collected: 12/20/24 0753    Lab Status: Final result Specimen: Blood Updated: 12/20/24 0754     POC Glucose 296 mg/dl     Fingerstick Glucose (POCT) [[id number]]  (Abnormal) Collected: 12/20/24 0702    Lab Status: Final result Specimen: Blood Updated: 12/20/24 0703     POC Glucose 448 mg/dl     Urine Microscopic [[id number]]  (Normal) Collected: 12/20/24 0626    Lab Status: Final result Specimen: Urine, Clean Catch Updated: 12/20/24 0640     RBC, UA 1-2 /hpf      WBC, UA 1-2 /hpf      Epithelial Cells Occasional /hpf      Bacteria, UA Occasional /hpf     UA w Reflex to Microscopic w Reflex to Culture [[id number]]  (Abnormal) Collected: 12/20/24 0626    Lab Status: Final result Specimen: Urine, Clean Catch Updated: 12/20/24 0635     Color, UA Light Yellow     Clarity, UA Clear     Specific Gravity, UA 1.027     pH, UA 5.5     Leukocytes, UA Elevated glucose may cause decreased leukocyte values. See urine microscopic for UWBC result     Nitrite, UA Negative     Protein, UA Negative mg/dl      Glucose, UA >=1000 (1%) mg/dl      Ketones,  (3+) mg/dl      Urobilinogen, UA <2.0 mg/dl      Bilirubin, UA Negative     Occult Blood, UA Negative    Fingerstick Glucose (POCT) [[id number]]  (Abnormal) Collected: 12/20/24 0631    Lab Status: Final result Specimen: Blood Updated: 12/20/24 0632     POC Glucose 458 mg/dl     Comprehensive metabolic panel [[id number]]  (Abnormal) Collected: 12/20/24 0526    Lab Status: Final result Specimen: Blood from Arm, Right Updated: 12/20/24 0625     Sodium 131 mmol/L      Potassium 4.3 mmol/L      Chloride 97 mmol/L      CO2 20 mmol/L      ANION GAP 14 mmol/L      BUN 23 mg/dL      Creatinine 1.15 mg/dL      Glucose 539 mg/dL      Calcium 9.4 mg/dL      AST 12 U/L      ALT 12 U/L      Alkaline Phosphatase 56 U/L      Total Protein 7.5 g/dL      Albumin 4.5 g/dL      Total Bilirubin 0.84 mg/dL      eGFR 62  ml/min/1.73sq m     Narrative:      National Kidney Disease Foundation guidelines for Chronic Kidney Disease (CKD):     Stage 1 with normal or high GFR (GFR > 90 mL/min/1.73 square meters)    Stage 2 Mild CKD (GFR = 60-89 mL/min/1.73 square meters)    Stage 3A Moderate CKD (GFR = 45-59 mL/min/1.73 square meters)    Stage 3B Moderate CKD (GFR = 30-44 mL/min/1.73 square meters)    Stage 4 Severe CKD (GFR = 15-29 mL/min/1.73 square meters)    Stage 5 End Stage CKD (GFR <15 mL/min/1.73 square meters)  Note: GFR calculation is accurate only with a steady state creatinine    Lipase [[id number]]  (Normal) Collected: 12/20/24 0526    Lab Status: Final result Specimen: Blood from Arm, Right Updated: 12/20/24 0624     Lipase 31 u/L     hCG, qualitative pregnancy [[id number]]  (Normal) Collected: 12/20/24 0526    Lab Status: Final result Specimen: Blood from Arm, Right Updated: 12/20/24 0623     Preg, Serum Negative    Blood gas, venous [[id number]]  (Abnormal) Collected: 12/20/24 0526    Lab Status: Final result Specimen: Blood from Arm, Right Updated: 12/20/24 0619     pH, Kurt 7.288     pCO2, Kurt 33.3 mm Hg      pO2, Kurt 62.5 mm Hg      HCO3, Kurt 15.6 mmol/L      Base Excess, Kurt -9.9 mmol/L      O2 Content, Kurt 16.5 ml/dL      O2 HGB, VENOUS 86.5 %     Beta Hydroxybutyrate [[id number]]  (Abnormal) Collected: 12/20/24 0526    Lab Status: Final result Specimen: Blood from Arm, Right Updated: 12/20/24 0614     Beta- Hydroxybutyrate 2.95 mmol/L     FLU/COVID Rapid Antigen (30 min. TAT) - Preferred screening test in ED [[id number]]  (Normal) Collected: 12/20/24 0526    Lab Status: Final result Specimen: Nares from Nose Updated: 12/20/24 0614     SARS COV Rapid Antigen Negative     Influenza A Rapid Antigen Negative     Influenza B Rapid Antigen Negative    Narrative:      This test has been performed using the Tracabidel Nancy 2 FLU+SARS Antigen test under the Emergency Use Authorization (EUA). This test has been validated by the   and verified by the performing laboratory. The Nancy uses lateral flow immunofluorescent sandwich assay to detect SARS-COV, Influenza A and Influenza B Antigen.     The Quidel Nancy 2 SARS Antigen test does not differentiate between SARS-CoV and SARS-CoV-2.     Negative results are presumptive and may be confirmed with a molecular assay, if necessary, for patient management. Negative results do not rule out SARS-CoV-2 or influenza infection and should not be used as the sole basis for treatment or patient management decisions. A negative test result may occur if the level of antigen in a sample is below the limit of detection of this test.     Positive results are indicative of the presence of viral antigens, but do not rule out bacterial infection or co-infection with other viruses.     All test results should be used as an adjunct to clinical observations and other information available to the provider.    FOR PEDIATRIC PATIENTS - copy/paste COVID Guidelines URL to browser: [email or web address]    Lactic acid, plasma (w/reflex if result > 2.0) [[id number]]  (Normal) Collected: 12/20/24 0526    Lab Status: Final result Specimen: Blood from Arm, Right Updated: 12/20/24 0606     LACTIC ACID 1.1 mmol/L     Narrative:      Result may be elevated if tourniquet was used during collection.    CBC and differential [[id number]]  (Abnormal) Collected: 12/20/24 0526    Lab Status: Final result Specimen: Blood from Arm, Right Updated: 12/20/24 0542     WBC 12.61 Thousand/uL      RBC 4.32 Million/uL      Hemoglobin 13.4 g/dL      Hematocrit 40.0 %      MCV 93 fL      MCH 31.0 pg      MCHC 33.5 g/dL      RDW 12.3 %      MPV 9.3 fL      Platelets 296 Thousands/uL      nRBC 0 /100 WBCs      Segmented % 81 %      Immature Grans % 0 %      Lymphocytes % 13 %      Monocytes % 5 %      Eosinophils Relative 1 %      Basophils Relative 0 %      Absolute Neutrophils 10.21  Thousands/µL      Absolute Immature Grans 0.04 Thousand/uL      Absolute Lymphocytes 1.59 Thousands/µL      Absolute Monocytes 0.60 Thousand/µL      Eosinophils Absolute 0.14 Thousand/µL      Basophils Absolute 0.03 Thousands/µL     Fingerstick Glucose (POCT) [[id number]]  (Abnormal) Collected: 12/20/24 0510    Lab Status: Final result Specimen: Blood Updated: 12/20/24 0511     POC Glucose 469 mg/dl             XR chest portable   Final Interpretation by Oscar Hall MD (12/20 1410)      No acute cardiopulmonary disease.            Workstation performed: [id number]             Procedures    ED Medication and Procedure Management   None     Discharge Medication List as of 12/21/2024 12:06 PM        START taking these medications    Details   insulin glargine (LANTUS) 100 units/mL subcutaneous injection Inject 10 Units under the skin daily, Starting Sat 12/21/2024, Normal      Insulin Glargine Solostar (Lantus SoloStar) 100 UNIT/ML SOPN Inject 0.1 mL (10 Units total) under the skin daily with dinner, Starting Sat 12/21/2024, Normal      insulin lispro (HumaLOG KwikPen) 100 units/mL injection pen Inject 3 Units under the skin 3 (three) times a day with meals, Starting Sat 12/21/2024, Normal           No discharge procedures on file.  ED SEPSIS DOCUMENTATION   Time reflects when diagnosis was documented in both MDM as applicable and the Disposition within this note       Time User Action Codes Description Comment    12/20/2024  7:20 AM Peter Matias Add [R73.9] Hyperglycemia     12/20/2024  7:20 AM Peter Matias Add [E11.10] DKA (diabetic ketoacidosis) (HCC)     12/21/2024 11:53 AM Naveed Mora Add [E10.10] DKA, type 1 (HCC)     12/21/2024 11:53 AM Naveed Mora Add [E10.69] Type 1 diabetes mellitus with other specified complication (HCC)                  Peter Matias PA-C  12/23/24 0013

## 2024-12-20 NOTE — H&P
"H&P - Hospitalist   Name: Erendira Albarran 34 y.o. female I MRN: [id number]  Unit/Bed#: ICU 02 I Date of Admission: 12/20/2024   Date of Service: 12/20/2024 I Hospital Day: 0     Assessment & Plan  DKA, type 1 (HCC)  No results found for: \"HGBA1C\"    Recent Labs     12/20/24  0753 12/20/24  0920 12/20/24  1041 12/20/24  1122   POCGLU 296* 289* 228* 179*       Blood Sugar Average: Last 72 hrs:  (P) 338.2216301260466673    Patient presented with mild DKA.  Blood sugar 539, anion gap 14, bicarb 20, beta hydroxybutyrate 2.95  Patient with history of DKA in the past, type 1 diabetes on insulin pump  Per patient since yesterday her pump was reading high in spite of giving multiple boluses.  Usually DKA happens when she has UTI.  But this time no symptoms of UTI or URI.  ICU was consulted by ER recommended admission on the floor with regular insulin infusion no need for DKA protocol.  Received 6 unit insulin and IV fluid in ER, repeat BMP and ankle has already closed.  Admitted in stepdown, taper insulin infusion based on fingerstick sugar, BMP  Long-acting and insulin sliding scale  Hold insulin pump for now  No obvious source of sepsis      Type 1 diabetes mellitus with other specified complication (HCC)  No results found for: \"HGBA1C\"    Recent Labs     12/20/24  0753 12/20/24  0920 12/20/24  1041 12/20/24  1122   POCGLU 296* 289* 228* 179*       Blood Sugar Average: Last 72 hrs:  (P) 338.6781454324843033    See above    Anxiety and depression  Not taking angiolytic per patient  Arnold-Chiari malformation, type I (HCC)    Cutaneous lupus erythematosus  Not on treatment  Gastroparesis diabeticorum  (HCC)  No results found for: \"HGBA1C\"    Recent Labs     12/20/24  0753 12/20/24  0920 12/20/24  1041 12/20/24  1122   POCGLU 296* 289* 228* 179*       Blood Sugar Average: Last 72 hrs:  (P) 338.5365525024580332  Denies abdominal pain  Paroxysmal supraventricular tachycardia (HCC)  With mild tachycardia which is resolved " now      VTE Pharmacologic Prophylaxis:   Low Risk (Score 0-2) - Encourage Ambulation.  Code Status: Level 1 - Full Code   Discussion with family:   .     Anticipated Length of Stay: Patient will be admitted on an observation basis with an anticipated length of stay of less than 2 midnights secondary to  .    History of Present Illness   Chief Complaint: Keeps reading high    Erendira Albarran is a 34 y.o. female with a PMH of type 1 diabetes, gastroparesis, history of DKA in the past on insulin pump, cutaneous lupus, cherry formation, who presents with sugars reading high  Patient currently on vacation here in the area originally from [place].  Stated she was doing fine until yesterday her blood sugar reading was low she had dinner.  After dinner blood sugar Reading high, in spite of multiple boluses it would go down for little bit but back up again.  She denies any URI, fever, chills, cough.  No urinary symptoms at this time.  Patient stated that previously when she has DKA is associated with UTI.  ER consulted ICU, recommended admission on the floor with insulin infusion    Review of Systems  14 point review of system negative except for that mentioned in HPI  Historical Information   Past Medical History:   Diagnosis Date    Diabetes mellitus (HCC)     Gastroparesis     Lupus (systemic lupus erythematosus) (HCC)     PCOS (polycystic ovarian syndrome)      Past Surgical History:   Procedure Laterality Date    ABDOMINOPLASTY       SECTION  2014     SECTION  2016    CHOLECYSTECTOMY       Social History     Tobacco Use    Smoking status: Never    Smokeless tobacco: Never   Substance and Sexual Activity    Alcohol use: Not on file    Drug use: Not on file    Sexual activity: Not on file     E-Cigarette/Vaping     E-Cigarette/Vaping Substances     Family history non-contributory  Social History:  Marital Status: /Civil Union   Occupation: Nurse  Patient Pre-hospital Living Situation:  Home  Patient Pre-hospital Level of Mobility: walks  Patient Pre-hospital Diet Restrictions: Diabetic diet    Meds/Allergies   I have reviewed home medications using recent Epic encounter.  Prior to Admission medications    Not on File     Allergies   Allergen Reactions    Penicillins Anaphylaxis    Eliquis [Apixaban] Hives    Latex Hives    Ciprofloxacin Rash       Objective :  Temp:  [97.3 °F (36.3 °C)] 97.3 °F (36.3 °C)  HR:  [] 85  BP: (102-139)/(60-75) 102/62  Resp:  [17-22] 22  SpO2:  [96 %-99 %] 97 %  O2 Device: None (Room air)    Physical Exam  HENT:      Head: Normocephalic.      Mouth/Throat:      Mouth: Mucous membranes are moist.   Eyes:      Pupils: Pupils are equal, round, and reactive to light.   Cardiovascular:      Rate and Rhythm: Normal rate and regular rhythm.      Pulses: Normal pulses.      Heart sounds: Normal heart sounds.   Pulmonary:      Effort: Pulmonary effort is normal.      Breath sounds: Normal breath sounds.   Abdominal:      General: Bowel sounds are normal.      Palpations: Abdomen is soft.   Musculoskeletal:         General: Normal range of motion.      Cervical back: Normal range of motion.   Skin:     General: Skin is warm.   Neurological:      General: No focal deficit present.      Mental Status: She is alert and oriented to person, place, and time.   Psychiatric:         Mood and Affect: Mood normal.          Lines/Drains:            Lab Results: I have reviewed the following results:  Results from last 7 days   Lab Units 12/20/24  0526   WBC Thousand/uL 12.61*   HEMOGLOBIN g/dL 13.4   HEMATOCRIT % 40.0   PLATELETS Thousands/uL 296   SEGS PCT % 81*   LYMPHO PCT % 13*   MONO PCT % 5   EOS PCT % 1     Results from last 7 days   Lab Units 12/20/24  0754 12/20/24  0526   SODIUM mmol/L 135 131*   POTASSIUM mmol/L 3.9 4.3   CHLORIDE mmol/L 109* 97   CO2 mmol/L 16* 20*   BUN mg/dL 22 23   CREATININE mg/dL 0.90 1.15   ANION GAP mmol/L 10 14*   CALCIUM mg/dL 7.6* 9.4   ALBUMIN  "g/dL  --  4.5   TOTAL BILIRUBIN mg/dL  --  0.84   ALK PHOS U/L  --  56   ALT U/L  --  12   AST U/L  --  12*   GLUCOSE RANDOM mg/dL 320* 539*         Results from last 7 days   Lab Units 12/20/24  1122 12/20/24  1041 12/20/24  0920 12/20/24  0753 12/20/24  0702 12/20/24  0631 12/20/24  0510   POC GLUCOSE mg/dl 179* 228* 289* 296* 448* 458* 469*     No results found for: \"HGBA1C\"  Results from last 7 days   Lab Units 12/20/24  0526   LACTIC ACID mmol/L 1.1       Imaging Results Review: No pertinent imaging studies reviewed.  Other Study Results Review: No additional pertinent studies reviewed.    Administrative Statements   I have spent a total time of 50 minutes in caring for this patient on the day of the visit/encounter including Reviewing / ordering tests, medicine, procedures  .    ** Please Note: This note has been constructed using a voice recognition system. **    "

## 2024-12-20 NOTE — ASSESSMENT & PLAN NOTE
"No results found for: \"HGBA1C\"    Recent Labs     12/20/24  0753 12/20/24  0920 12/20/24  1041 12/20/24  1122   POCGLU 296* 289* 228* 179*       Blood Sugar Average: Last 72 hrs:  (P) 338.7452619604000423    See above    "

## 2024-12-21 VITALS
SYSTOLIC BLOOD PRESSURE: 105 MMHG | OXYGEN SATURATION: 97 % | TEMPERATURE: 98.5 F | WEIGHT: 133.6 LBS | DIASTOLIC BLOOD PRESSURE: 65 MMHG | RESPIRATION RATE: 20 BRPM | HEART RATE: 88 BPM

## 2024-12-21 LAB
ANION GAP SERPL CALCULATED.3IONS-SCNC: 7 MMOL/L (ref 4–13)
BUN SERPL-MCNC: 16 MG/DL (ref 5–25)
CALCIUM SERPL-MCNC: 7.9 MG/DL (ref 8.4–10.2)
CHLORIDE SERPL-SCNC: 108 MMOL/L (ref 96–108)
CO2 SERPL-SCNC: 20 MMOL/L (ref 21–32)
CREAT SERPL-MCNC: 0.89 MG/DL (ref 0.6–1.3)
GFR SERPL CREATININE-BSD FRML MDRD: 85 ML/MIN/1.73SQ M
GLUCOSE SERPL-MCNC: 125 MG/DL (ref 65–140)
GLUCOSE SERPL-MCNC: 134 MG/DL (ref 65–140)
GLUCOSE SERPL-MCNC: 225 MG/DL (ref 65–140)
GLUCOSE SERPL-MCNC: 329 MG/DL (ref 65–140)
GLUCOSE SERPL-MCNC: 79 MG/DL (ref 65–140)
MAGNESIUM SERPL-MCNC: 1.9 MG/DL (ref 1.9–2.7)
PHOSPHATE SERPL-MCNC: 3 MG/DL (ref 2.7–4.5)
POTASSIUM SERPL-SCNC: 4 MMOL/L (ref 3.5–5.3)
SODIUM SERPL-SCNC: 135 MMOL/L (ref 135–147)

## 2024-12-21 PROCEDURE — 80048 BASIC METABOLIC PNL TOTAL CA: CPT | Performed by: INTERNAL MEDICINE

## 2024-12-21 PROCEDURE — 82948 REAGENT STRIP/BLOOD GLUCOSE: CPT

## 2024-12-21 PROCEDURE — 84100 ASSAY OF PHOSPHORUS: CPT

## 2024-12-21 PROCEDURE — 83735 ASSAY OF MAGNESIUM: CPT

## 2024-12-21 PROCEDURE — 99239 HOSP IP/OBS DSCHRG MGMT >30: CPT | Performed by: INTERNAL MEDICINE

## 2024-12-21 RX ORDER — INSULIN GLARGINE 100 [IU]/ML
10 INJECTION, SOLUTION SUBCUTANEOUS
Qty: 9 ML | Refills: 0 | Status: SHIPPED | OUTPATIENT
Start: 2024-12-21

## 2024-12-21 RX ORDER — INSULIN GLARGINE 100 [IU]/ML
10 INJECTION, SOLUTION SUBCUTANEOUS ONCE
Status: COMPLETED | OUTPATIENT
Start: 2024-12-21 | End: 2024-12-21

## 2024-12-21 RX ORDER — INSULIN LISPRO 100 [IU]/ML
3 INJECTION, SOLUTION INTRAVENOUS; SUBCUTANEOUS
Qty: 8.1 ML | Refills: 0 | Status: SHIPPED | OUTPATIENT
Start: 2024-12-21

## 2024-12-21 RX ORDER — INSULIN GLARGINE 100 [IU]/ML
10 INJECTION, SOLUTION SUBCUTANEOUS DAILY
Qty: 9 ML | Refills: 0 | Status: SHIPPED | OUTPATIENT
Start: 2024-12-21

## 2024-12-21 RX ADMIN — INSULIN LISPRO 3 UNITS: 100 INJECTION, SOLUTION INTRAVENOUS; SUBCUTANEOUS at 11:10

## 2024-12-21 RX ADMIN — INSULIN LISPRO 2 UNITS: 100 INJECTION, SOLUTION INTRAVENOUS; SUBCUTANEOUS at 08:22

## 2024-12-21 RX ADMIN — INSULIN GLARGINE 10 UNITS: 100 INJECTION, SOLUTION SUBCUTANEOUS at 12:02

## 2024-12-21 RX ADMIN — NORETHINDRONE ACETATE AND ETHINYL ESTRADIOL AND FERROUS FUMARATE 1 TABLET: KIT at 12:03

## 2024-12-21 NOTE — PLAN OF CARE
Problem: PAIN - ADULT  Goal: Verbalizes/displays adequate comfort level or baseline comfort level  Description: Interventions:  - Encourage patient to monitor pain and request assistance  - Assess pain using appropriate pain scale  - Administer analgesics based on type and severity of pain and evaluate response  - Implement non-pharmacological measures as appropriate and evaluate response  - Consider cultural and social influences on pain and pain management  - Notify physician/advanced practitioner if interventions unsuccessful or patient reports new pain  Outcome: Progressing     Problem: INFECTION - ADULT  Goal: Absence or prevention of progression during hospitalization  Description: INTERVENTIONS:  - Assess and monitor for signs and symptoms of infection  - Monitor lab/diagnostic results  - Monitor all insertion sites, i.e. indwelling lines, tubes, and drains  - Monitor endotracheal if appropriate and nasal secretions for changes in amount and color  - [place] appropriate cooling/warming therapies per order  - Administer medications as ordered  - Instruct and encourage patient and family to use good hand hygiene technique  - Identify and instruct in appropriate isolation precautions for identified infection/condition  Outcome: Progressing  Goal: Absence of fever/infection during neutropenic period  Description: INTERVENTIONS:  - Monitor WBC    Outcome: Progressing     Problem: SAFETY ADULT  Goal: Patient will remain free of falls  Description: INTERVENTIONS:  - Educate patient/family on patient safety including physical limitations  - Instruct patient to call for assistance with activity   - Consult OT/PT to assist with strengthening/mobility   - Keep Call bell within reach  - Keep bed low and locked with side rails adjusted as appropriate  - Keep care items and personal belongings within reach  - Initiate and maintain comfort rounds  - Make Fall Risk Sign visible to staff  - Apply yellow socks and bracelet  for high fall risk patients  - Consider moving patient to room near nurses station  Outcome: Progressing  Goal: Maintain or return to baseline ADL function  Description: INTERVENTIONS:  -  Assess patient's ability to carry out ADLs; assess patient's baseline for ADL function and identify physical deficits which impact ability to perform ADLs (bathing, care of mouth/teeth, toileting, grooming, dressing, etc.)  - Assess/evaluate cause of self-care deficits   - Assess range of motion  - Assess patient's mobility; develop plan if impaired  - Assess patient's need for assistive devices and provide as appropriate  - Encourage maximum independence but intervene and supervise when necessary  - Involve family in performance of ADLs  - Assess for home care needs following discharge   - Consider OT consult to assist with ADL evaluation and planning for discharge  - Provide patient education as appropriate  Outcome: Progressing  Goal: Maintains/Returns to pre admission functional level  Description: INTERVENTIONS:  - Perform AM-PAC 6 Click Basic Mobility/ Daily Activity assessment daily.  - Set and communicate daily mobility goal to care team and patient/family/caregiver.   - Collaborate with rehabilitation services on mobility goals if consulted  - Out of bed to chair 4 times a day   - Out of bed for meals 3 times a day  - Out of bed for toileting  - Record patient progress and toleration of activity level   Outcome: Progressing     Problem: DISCHARGE PLANNING  Goal: Discharge to home or other facility with appropriate resources  Description: INTERVENTIONS:  - Identify barriers to discharge w/patient and caregiver  - Arrange for needed discharge resources and transportation as appropriate  - Identify discharge learning needs (meds, wound care, etc.)  - Arrange for interpretive services to assist at discharge as needed  - Refer to Case Management Department for coordinating discharge planning if the patient needs post-hospital  services based on physician/advanced practitioner order or complex needs related to functional status, cognitive ability, or social support system  Outcome: Progressing     Problem: Knowledge Deficit  Goal: Patient/family/caregiver demonstrates understanding of disease process, treatment plan, medications, and discharge instructions  Description: Complete learning assessment and assess knowledge base.  Interventions:  - Provide teaching at level of understanding  - Provide teaching via preferred learning methods  Outcome: Progressing

## 2024-12-21 NOTE — ASSESSMENT & PLAN NOTE
Lab Results   Component Value Date    HGBA1C 8.3 (H) 12/20/2024       Recent Labs     12/21/24  0005 12/21/24  0305 12/21/24  0821 12/21/24  1057   POCGLU 125 79 225* 329*       Blood Sugar Average: Last 72 hrs:  (P) 261.3820495608931015  Denies abdominal pain

## 2024-12-21 NOTE — PLAN OF CARE
Problem: SAFETY ADULT  Goal: Patient will remain free of falls  Description: INTERVENTIONS:  - Educate patient/family on patient safety including physical limitations  - Instruct patient to call for assistance with activity   - Consult OT/PT to assist with strengthening/mobility   - Keep Call bell within reach  - Keep bed low and locked with side rails adjusted as appropriate  - Keep care items and personal belongings within reach  - Initiate and maintain comfort rounds  Outcome: Progressing

## 2024-12-21 NOTE — ASSESSMENT & PLAN NOTE
Lab Results   Component Value Date    HGBA1C 8.3 (H) 12/20/2024       Recent Labs     12/21/24  0005 12/21/24  0305 12/21/24  0821 12/21/24  1057   POCGLU 125 79 225* 329*       Blood Sugar Average: Last 72 hrs:  (P) 261.8592540248475388    Patient presented with mild DKA.  Blood sugar 539, anion gap 14, bicarb 20, beta hydroxybutyrate 2.95  Patient with history of DKA in the past, type 1 diabetes on insulin pump  Per patient since yesterday her pump was reading high in spite of giving multiple boluses.  Usually DKA happens when she has UTI.  But this time no symptoms of UTI or URI.  ICU was consulted by ER recommended admission on the floor with regular insulin infusion no need for DKA protocol.  Long-acting and insulin sliding scale-Lantus 10 units and Humalog 3 units 3 times daily with meals.  Patient wants to resume insulin pump when she goes home but her insulin pump lost charge.  Her home is 1 hour away  No obvious source of sepsis.  No antibiotics for now

## 2024-12-21 NOTE — UTILIZATION REVIEW
"Initial Clinical Review    Admission: Date/Time/Statement:   Admission Orders (From admission, onward)       Ordered        12/20/24 0857  INPATIENT ADMISSION  Once                          Orders Placed This Encounter   Procedures    INPATIENT ADMISSION     Standing Status:   Standing     Number of Occurrences:   1     Level of Care:   Level 2 Stepdown / HOT [14]     Estimated length of stay:   More than 2 Midnights     Certification:   I certify that inpatient services are medically necessary for this patient for a duration of greater than two midnights. See H&P and MD Progress Notes for additional information about the patient's course of treatment.     ED Arrival Information       Expected   -    Arrival   12/20/2024 05:01    Acuity   Emergent              Means of arrival   Walk-In    Escorted by   Self    Service   Hospitalist    Admission type   Emergency              Arrival complaint   blood sugar issue             Chief Complaint   Patient presents with    Hyperglycemia - Symptomatic     Pt states \"I think I'm in DKA\" type 1, unable to obtain BG on pump reading too high on approx 30 units of insulin since 2030, +nausea and fatigue       Initial Presentation: 34 y.o. female with a PMH of type 1 diabetes on insulin pump, gastroparesis, history of DKA, cutaneous lupus, cherry formation, who presents with report of sugars reading high.  Patient currently on vacation here in the area originally from [place].  Stated she was doing fine until yesterday her blood sugar reading was low.  After dinner blood sugar was reading high, in spite of multiple boluses it would go down,  but back up again.  Glucose on arrival to the ED was 539, beta hydroxybutyrate 2.95. In the ED, patient received IVF, IV insulin and  IV Zofran and was placed on insulin gtt.   Plan:  Inpatient admission for evaluation and treatment of DKA:  Regular insulin infusion, hold insulin pump for now.     12/21 Discharge Summary:  Patient was " admitted as inpatient stepdown status given the diabetic ketoacidosis,  but made better than expected recovery. Patient is hemodynamically stable.  Prescription for Lantus and NovoLog given. Patient wants to resume insulin pump when she goes home but her insulin pump lost charge.  Patient says when she goes back home she will charge her insulin pump and resume the same.  Patient hemodynamically stable for discharge and advised to follow-up with her PCP and endocrinologist.      12/21 1235 Discharge to home/self care.       ED Treatment-Medication Administration from 12/20/2024 0501 to 12/20/2024 1021         Date/Time Order Dose Route Action     12/20/2024 0524 sodium chloride 0.9 % bolus 1,000 mL 1,000 mL Intravenous New Bag     12/20/2024 0647 ondansetron (ZOFRAN) injection 4 mg 4 mg Intravenous Given     12/20/2024 0705 insulin regular (HumuLIN R,NovoLIN R) injection 6 Units 6 Units Intravenous Given     12/20/2024 0921 insulin regular (HumuLIN R,NovoLIN R) 1 Units/mL in sodium chloride 0.9 % 100 mL infusion 5 Units/hr Intravenous New Bag     12/20/2024 0949 insulin glargine (LANTUS) subcutaneous injection 8 Units 0.08 mL 8 Units Subcutaneous Given            Scheduled Medications:  heparin (porcine), 5,000 Units, Subcutaneous, Q8H RIKA  insulin lispro, 1-5 Units, Subcutaneous, TID AC  insulin lispro, 1-5 Units, Subcutaneous, HS  norethindrone-ethinyl estradiol, 1 tablet, Oral, Daily      Continuous IV Infusions:    sodium chloride 0.9 % infusion  Rate: 75 mL/hr Dose: 75 mL/hr  Freq: Continuous Route: IV  Last Dose: Stopped (12/21/24 1202)  Start: 12/20/24 1145     insulin regular (HumuLIN R,NovoLIN R) 1 Units/mL in sodium chloride 0.9 % 100 mL infusion  Rate: 0.3-21 mL/hr Dose: 0.3-21 Units/hr  Freq: Titrated Route: IV  Last Dose: Stopped (12/20/24 1326)  Start: 12/20/24 0800 End: 12/20/24 1306        PRN Meds:  acetaminophen, 650 mg, Oral, Q6H PRN  magnesium hydroxide, 30 mL, Oral, Daily PRN  ondansetron, 4 mg,  Intravenous, Q6H PRN      ED Triage Vitals   Temperature Pulse Respirations Blood Pressure SpO2 Pain Score   12/20/24 0507 12/20/24 0507 12/20/24 0554 12/20/24 0507 12/20/24 0507 12/20/24 1100   (!) 97.3 °F (36.3 °C) (!) 106 17 139/75 99 % No Pain     Weight (last 2 days) before discharge       Date/Time Weight    12/20/24 0507 60.6 (133.6)            Vital Signs (last 3 days) before discharge       Date/Time Temp Pulse Resp BP MAP (mmHg) SpO2 O2 Device Patient Position - Orthostatic VS Pain    12/21/24 0900 -- -- -- -- -- -- -- -- No Pain    12/21/24 0800 98.5 °F (36.9 °C) 88 20 105/65 -- 97 % None (Room air) Lying --    12/21/24 0400 -- -- -- -- -- -- -- -- No Pain    12/20/24 2230 98.2 °F (36.8 °C) -- -- 100/64 75 -- -- -- --    12/20/24 2000 -- -- -- -- -- -- -- -- No Pain    12/20/24 1900 99 °F (37.2 °C) -- -- 119/72 -- -- -- Lying --    12/20/24 1600 -- -- -- -- -- -- -- -- No Pain    12/20/24 1300 98.9 °F (37.2 °C) -- -- 110/71 86 -- None (Room air) Lying No Pain    12/20/24 1200 -- -- -- -- -- -- -- -- No Pain    12/20/24 1100 98.4 °F (36.9 °C) -- -- -- -- -- -- -- No Pain    12/20/24 0900 -- 85 22 102/62 76 97 % None (Room air) -- --    12/20/24 0830 -- 107 20 108/60 78 99 % None (Room air) -- --    12/20/24 0745 -- 89 20 -- -- 96 % None (Room air) -- --    12/20/24 0554 -- -- 17 -- -- -- -- -- --    12/20/24 0507 97.3 °F (36.3 °C) 106 -- 139/75 100 99 % None (Room air) Sitting --              Pertinent Labs/Diagnostic Test Results:   Radiology:  XR chest portable   Final Interpretation by Oscar Hall MD (12/20 1410)      No acute cardiopulmonary disease.            Workstation performed: [id number]           Cardiology:  ECG 12 lead   Final Result by Leena Nunez MD (12/20 2886)   Sinus tachycardia   Otherwise normal ECG   No previous ECGs available   Confirmed by Leena Nunez ([id number]) on 12/20/2024 5:31:01 PM              Results from last 7 days   Lab Units 12/20/24  1251 12/20/24  0526   WBC  Thousand/uL  --  12.61*   HEMOGLOBIN g/dL  --  13.4   HEMATOCRIT %  --  40.0   PLATELETS Thousands/uL 236 296   TOTAL NEUT ABS Thousands/µL  --  10.21*         Results from last 7 days   Lab Units 12/21/24  0431 12/20/24 2029 12/20/24  1657 12/20/24  1234 12/20/24  0754   SODIUM mmol/L 135 133* 132* 135 135   POTASSIUM mmol/L 4.0 4.0 4.4 3.9 3.9   CHLORIDE mmol/L 108 107 106 109* 109*   CO2 mmol/L 20* 22 20* 21 16*   ANION GAP mmol/L 7 4 6 5 10   BUN mg/dL 16 22 22 19 22   CREATININE mg/dL 0.89 1.10 1.07 0.90 0.90   EGFR ml/min/1.73sq m 85 66 68 84 84   CALCIUM mg/dL 7.9* 8.1* 8.0* 8.1* 7.6*   MAGNESIUM mg/dL 1.9  --   --   --   --    PHOSPHORUS mg/dL 3.0  --   --   --   --      Results from last 7 days   Lab Units 12/20/24  0526   AST U/L 12*   ALT U/L 12   ALK PHOS U/L 56   TOTAL PROTEIN g/dL 7.5   ALBUMIN g/dL 4.5   TOTAL BILIRUBIN mg/dL 0.84     Results from last 7 days   Lab Units 12/21/24  1057 12/21/24  0821 12/21/24  0305 12/21/24  0005 12/20/24  2125 12/20/24  2019 12/20/24  1700 12/20/24  1230 12/20/24  1122 12/20/24  1041 12/20/24  0920 12/20/24  0753   POC GLUCOSE mg/dl 329* 225* 79 125 197* 196* 279* 132 179* 228* 289* 296*     Results from last 7 days   Lab Units 12/21/24  0431 12/20/24 2029 12/20/24  1657 12/20/24  1234 12/20/24  0754 12/20/24  0526   GLUCOSE RANDOM mg/dL 134 221* 297* 140 320* 539*         Results from last 7 days   Lab Units 12/20/24  0526   HEMOGLOBIN A1C % 8.3*   EAG mg/dl 192     Beta- Hydroxybutyrate   Date Value Ref Range Status   12/20/2024 2.95 (H) 0.02 - 0.27 mmol/L Final          Results from last 7 days   Lab Units 12/20/24  0526   PH AZUCENA  7.288*   PCO2 AZUCENA mm Hg 33.3*   PO2 AZUCENA mm Hg 62.5*   HCO3 AZUCENA mmol/L 15.6*   BASE EXC AZUCENA mmol/L -9.9   O2 CONTENT AZUCENA ml/dL 16.5   O2 HGB, VENOUS % 86.5*                         Results from last 7 days   Lab Units 12/20/24  0754   TSH 3RD GENERATON uIU/mL 0.452         Results from last 7 days   Lab Units 12/20/24  0526   LACTIC  ACID mmol/L 1.1               Results from last 7 days   Lab Units 12/20/24  0526   LIPASE u/L 31                 Results from last 7 days   Lab Units 12/20/24  0626   CLARITY UA  Clear   COLOR UA  Light Yellow   SPEC GRAV UA  1.027   PH UA  5.5   GLUCOSE UA mg/dl >=1000 (1%)*   KETONES UA mg/dl 100 (3+)*   BLOOD UA  Negative   PROTEIN UA mg/dl Negative   NITRITE UA  Negative   BILIRUBIN UA  Negative   UROBILINOGEN UA (BE) mg/dl <2.0   LEUKOCYTES UA  Elevated glucose may cause decreased leukocyte values. See urine microscopic for UWBC result*   WBC UA /hpf 1-2   RBC UA /hpf 1-2   BACTERIA UA /hpf Occasional   EPITHELIAL CELLS WET PREP /hpf Occasional                     Past Medical History:   Diagnosis Date    Diabetes mellitus (HCC)     Gastroparesis     Lupus (systemic lupus erythematosus) (HCC)     PCOS (polycystic ovarian syndrome)      Present on Admission:   DKA, type 1 (HCC)   Type 1 diabetes mellitus with other specified complication (HCC)   Anxiety and depression   Arnold-Chiari malformation, type I (HCC)   Cutaneous lupus erythematosus   Gastroparesis diabeticorum  (HCC)   Paroxysmal supraventricular tachycardia (HCC)      Admitting Diagnosis: DKA (diabetic ketoacidosis) (HCC) [E11.10]  Hyperglycemia [R73.9]  Age/Sex: 34 y.o. female    Network Utilization Review Department  ATTENTION: Please call with any questions or concerns to [phone] and carefully listen to the prompts so that you are directed to the right person. All voicemails are confidential.   For Discharge needs, contact Care Management DC Support Team at [phone] opt. 2  Send all requests for admission clinical reviews, approved or denied determinations and any other requests to dedicated fax number below belonging to the campus where the patient is receiving treatment. List of dedicated fax numbers for the Facilities:  FACILITY NAME UR FAX NUMBER   ADMISSION DENIALS (Administrative/Medical Necessity) [phone]   DISCHARGE SUPPORT  TEAM (NETWORK) [phone]   PARENT CHILD HEALTH (Maternity/NICU/Pediatrics) [phone]   [facility] [phone]   [facility] [phone]   [facility] [phone]   [facility] [phone]   [facility] [phone]   [facility] [phone]   [facility] [phone]   [facility] [phone]   [facility] [phone]   [facility] [phone]   [facility] [phone]   [facility] [phone]

## 2024-12-21 NOTE — ASSESSMENT & PLAN NOTE
Lab Results   Component Value Date    HGBA1C 8.3 (H) 12/20/2024       Recent Labs     12/21/24  0005 12/21/24  0305 12/21/24  0821 12/21/24  1057   POCGLU 125 79 225* 329*       Blood Sugar Average: Last 72 hrs:  (P) 261.3077223466079824    See plan under DKA

## 2024-12-21 NOTE — DISCHARGE SUMMARY
Discharge Summary - Hospitalist   Name: Erendira Albarran 34 y.o. female I MRN: [id number]  Unit/Bed#: ICU 02 I Date of Admission: 12/20/2024   Date of Service: 12/21/2024 I Hospital Day: 1     Assessment & Plan  DKA, type 1 (HCC)  Lab Results   Component Value Date    HGBA1C 8.3 (H) 12/20/2024       Recent Labs     12/21/24  0005 12/21/24  0305 12/21/24  0821 12/21/24  1057   POCGLU 125 79 225* 329*       Blood Sugar Average: Last 72 hrs:  (P) 261.5244786695190649    Patient presented with mild DKA.  Blood sugar 539, anion gap 14, bicarb 20, beta hydroxybutyrate 2.95  Patient with history of DKA in the past, type 1 diabetes on insulin pump  Per patient since yesterday her pump was reading high in spite of giving multiple boluses.  Usually DKA happens when she has UTI.  But this time no symptoms of UTI or URI.  ICU was consulted by ER recommended admission on the floor with regular insulin infusion no need for DKA protocol.  Long-acting and insulin sliding scale-Lantus 10 units and Humalog 3 units 3 times daily with meals.  Patient wants to resume insulin pump when she goes home but her insulin pump lost charge.  Her home is 1 hour away  No obvious source of sepsis.  No antibiotics for now      Type 1 diabetes mellitus with other specified complication (HCC)  Lab Results   Component Value Date    HGBA1C 8.3 (H) 12/20/2024       Recent Labs     12/21/24  0005 12/21/24  0305 12/21/24  0821 12/21/24  1057   POCGLU 125 79 225* 329*       Blood Sugar Average: Last 72 hrs:  (P) 261.4425378477747870    See plan under DKA    Anxiety and depression  Not taking angiolytic per patient.  Arnold-Chiari malformation, type I (HCC)  Chronic condition, continue supportive care  Cutaneous lupus erythematosus  Not on treatment  Gastroparesis diabeticorum  (HCC)  Lab Results   Component Value Date    HGBA1C 8.3 (H) 12/20/2024       Recent Labs     12/21/24  0005 12/21/24  0305 12/21/24  0821 12/21/24  1057   POCGLU 125 79 225* 329*        Blood Sugar Average: Last 72 hrs:  (P) 261.0822499822761492  Denies abdominal pain  Paroxysmal supraventricular tachycardia (HCC)  With mild tachycardia which is resolved now   Admitting Provider:  Denise Wilson MD  Discharge Provider:  Naveed Mora MD  Admission Date: 12/20/2024       Discharge Date: 12/21/24   LOS: 1  Primary Care Physician at Discharge: No primary care provider on file. None    HOSPITAL COURSE:  Erendira Albarran is a 34 y.o. female who presented with high blood sugars and DKA.  Patient is on vacation in the [facility] and her insulin pump malfunction and lost discharge.  Patient is admitted and was given intravenous insulin followed by subcu insulin.  Patient is better right now and hemodynamically stable.  Prescription for Lantus and NovoLog given.  Patient says when she goes back home she will charge her insulin pump and resume the same.  Patient hemodynamically stable for discharge and advised to follow-up with her PCP and endocrinologist    Patient was admitted as inpatient and stepdown status given the diabetic ketoacidosis but made better than expected recovery and therefore is being discharged after 1 midnight.  At admission she was thought to require at least a 2 midnight stay    Mobility:   Basic Mobility Inpatient Raw Score: 24  JH-HLM Goal: 8: Walk 250 feet or more  JH-HLM Achieved: 8: Walk 250 feet ot more  JH-HLM Goal achieved. Continue to encourage appropriate mobility.    REASON FOR ADMISSION/ ADMISSION DIAGNOSES    DKA    DISCHARGE DIAGNOSES  Type 1 diabetes mellitus with other specified complication (HCC)  Assessment & Plan  Lab Results   Component Value Date    HGBA1C 8.3 (H) 12/20/2024       Recent Labs     12/21/24  0005 12/21/24  0305 12/21/24  0821 12/21/24  1057   POCGLU 125 79 225* 329*         Blood Sugar Average: Last 72 hrs:  (P) 261.4065546281900631    See plan under DKA      Cutaneous lupus erythematosus  Assessment & Plan  Not on  treatment    Arnold-Chiari malformation, type I (HCC)  Assessment & Plan  Chronic condition, continue supportive care    Anxiety and depression  Assessment & Plan  Not taking angiolytic per patient.    * DKA, type 1 (HCC)  Assessment & Plan  Lab Results   Component Value Date    HGBA1C 8.3 (H) 12/20/2024       Recent Labs     12/21/24  0005 12/21/24  0305 12/21/24  0821 12/21/24  1057   POCGLU 125 79 225* 329*         Blood Sugar Average: Last 72 hrs:  (P) 261.4830138963962870    Patient presented with mild DKA.  Blood sugar 539, anion gap 14, bicarb 20, beta hydroxybutyrate 2.95  Patient with history of DKA in the past, type 1 diabetes on insulin pump  Per patient since yesterday her pump was reading high in spite of giving multiple boluses.  Usually DKA happens when she has UTI.  But this time no symptoms of UTI or URI.  ICU was consulted by ER recommended admission on the floor with regular insulin infusion no need for DKA protocol.  Long-acting and insulin sliding scale-Lantus 10 units and Humalog 3 units 3 times daily with meals.  Patient wants to resume insulin pump when she goes home but her insulin pump lost charge.  Her home is 1 hour away  No obvious source of sepsis.  No antibiotics for now          CONSULTING PROVIDERS   None    PROCEDURES PERFORMED  * No surgery found *    RADIOLOGY RESULTS  XR chest portable  Result Date: 12/20/2024  Impression: No acute cardiopulmonary disease. Workstation performed: [id number]       LABS  Results from last 7 days   Lab Units 12/20/24  1657 12/20/24  0526   WBC Thousand/uL  --  12.61*   HEMOGLOBIN g/dL  --  13.4   HEMATOCRIT %  --  40.0   MCV fL  --  93   PLATELETS Thousands/uL 236 296     Results from last 7 days   Lab Units 12/21/24  0431 12/20/24 2029 12/20/24  1657 12/20/24  1234 12/20/24  0754 12/20/24  0526   SODIUM mmol/L 135 133* 132* 135 135 131*   POTASSIUM mmol/L 4.0 4.0 4.4 3.9 3.9 4.3   CHLORIDE mmol/L 108 107 106 109* 109* 97   CO2 mmol/L 20* 22 20* 21  16* 20*   BUN mg/dL 16 22 22 19 22 23   CREATININE mg/dL 0.89 1.10 1.07 0.90 0.90 1.15   CALCIUM mg/dL 7.9* 8.1* 8.0* 8.1* 7.6* 9.4   ALBUMIN g/dL  --   --   --   --   --  4.5   TOTAL BILIRUBIN mg/dL  --   --   --   --   --  0.84   ALK PHOS U/L  --   --   --   --   --  56   ALT U/L  --   --   --   --   --  12   AST U/L  --   --   --   --   --  12*   EGFR ml/min/1.73sq m 85 66 68 84 84 62   GLUCOSE RANDOM mg/dL 134 221* 297* 140 320* 539*                  Results from last 7 days   Lab Units 12/21/24  1057 12/21/24  0821 12/21/24  0305 12/21/24  0005 12/20/24  2125 12/20/24  2019 12/20/24  1700 12/20/24  1230 12/20/24  1122 12/20/24  1041   POC GLUCOSE mg/dl 329* 225* 79 125 197* 196* 279* 132 179* 228*     Results from last 7 days   Lab Units 12/20/24  0526   HEMOGLOBIN A1C % 8.3*     Results from last 7 days   Lab Units 12/20/24  0754   TSH 3RD GENERATON uIU/mL 0.452     Results from last 7 days   Lab Units 12/20/24  0526   LACTIC ACID mmol/L 1.1           Cultures:   Results from last 7 days   Lab Units 12/20/24  0626   COLOR UA  Light Yellow   CLARITY UA  Clear   SPEC GRAV UA  1.027   PH UA  5.5   LEUKOCYTES UA  Elevated glucose may cause decreased leukocyte values. See urine microscopic for UWBC result*   NITRITE UA  Negative   GLUCOSE UA mg/dl >=1000 (1%)*   KETONES UA mg/dl 100 (3+)*   BILIRUBIN UA  Negative   BLOOD UA  Negative      Results from last 7 days   Lab Units 12/20/24  0626   RBC UA /hpf 1-2   WBC UA /hpf 1-2   EPITHELIAL CELLS WET PREP /hpf Occasional   BACTERIA UA /hpf Occasional            PHYSICAL EXAM:  Vitals:   Blood Pressure: 105/65 (12/21/24 0800)  Pulse: 88 (12/21/24 0800)  Temperature: 98.5 °F (36.9 °C) (12/21/24 0800)  Temp Source: Oral (12/21/24 0800)  Respirations: 20 (12/21/24 0800)  Weight - Scale: 60.6 kg (133 lb 9.6 oz) (12/20/24 0507)  SpO2: 97 % (12/21/24 0800)    General appearance: alert, appears stated age, and cooperative  HEENT - atraumatic and normocephalic  Neck-  supple  Skin - no fresh rash  Extremities no fresh focal deformities  Cardiovascular- S1-S2 heard  Respiratory- bilateral air entry present, no crackles or rhonchi  Skin - no fresh rash  Abdomen - normal bowel sounds present, no rebound tenderness  CNS- No fresh focal deficits  Psych- no acute psychosis     Planned Re-admission: No  Discharge Disposition: Home/Self Care    Test Results Pending at Discharge:   Incidental findings: None    Medications   Summary of Medication Adjustments made as a result of this hospitalization: Lantus and NovoLog prescribed  Medication Dosing Tapers - Please refer to Discharge Medication List for details on any medication dosing tapers (if applicable to patient).  Discharge Medication List: See after visit summary for reconciled discharge medications.     Diet restrictions: Heart healthy carbohydrate consistent diet       Diet Orders   (From admission, onward)                 Start     Ordered    12/20/24 1127  Diet Toan/CHO Controlled; Consistent Carbohydrate Diet Level 2 (5 carb servings/75 grams CHO/meal)  Diet effective now        References:    Adult Nutrition Support Algorithm    RD Therapeutic Diet Order Protocol   Question Answer Comment   Diet Type Toan/CHO Controlled    Toan/CHO Controlled Consistent Carbohydrate Diet Level 2 (5 carb servings/75 grams CHO/meal)    RD to adjust diet per protocol? Yes        12/20/24 1126                  Activity restrictions: No strenuous activity  Discharge Condition: stable    Outpatient Follow-Up and Discharge Instructions  See after visit summary section titled Discharge Instructions for information provided to patient and family.      Code Status: Level 1 - Full Code  Discharge Statement   I spent 35 minutes discharging the patient. This time was spent on the day of discharge. Greater than 50% of total time was spent with the patient and / or family counseling and / or coordination of care.    Naveed Mora MD  [facility] Internal  Medicine    ** Please Note: This note has been constructed using a voice recognition system. **

## 2024-12-30 NOTE — UTILIZATION REVIEW
NOTIFICATION OF ADMISSION DISCHARGE   This is a Notification of Discharge from [facility]. Please be advised that this patient has been discharge from our facility. Below you will find the admission and discharge date and time including the patient’s disposition.   UTILIZATION REVIEW CONTACT:  Isabel Matthews  Utilization   Network Utilization Review Department  Phone: [phone] x carefully listen to the prompts. All voicemails are confidential.  Email: [email or web address]     ADMISSION INFORMATION  PRESENTATION DATE: 12/20/2024  5:17 AM  OBERVATION ADMISSION DATE: N/A  INPATIENT ADMISSION DATE: 12/20/24  8:57 AM   DISCHARGE DATE: 12/21/2024 12:35 PM   DISPOSITION:Home/Self Care    Network Utilization Review Department  ATTENTION: Please call with any questions or concerns to [phone] and carefully listen to the prompts so that you are directed to the right person. All voicemails are confidential.   For Discharge needs, contact Care Management DC Support Team at [phone] opt. [phone]  Send all requests for admission clinical reviews, approved or denied determinations and any other requests to dedicated fax number below belonging to the campus where the patient is receiving treatment. List of dedicated fax numbers for the Facilities:  FACILITY NAME UR FAX NUMBER   ADMISSION DENIALS (Administrative/Medical Necessity) [phone]   DISCHARGE SUPPORT TEAM ([facility]) [phone]   PARENT CHILD HEALTH (Maternity/NICU/Pediatrics) [phone]   [facility] [phone]   General acute hospital [phone]   [facility] [phone]   [facility] [phone]   [facility] [phone]   [facility] [phone]   [facility] [phone]   [facility]  [phone]   [facility] [phone]   [facility] [phone]   [facility] [phone]   [facility] [phone]